# Patient Record
Sex: FEMALE | Race: AMERICAN INDIAN OR ALASKA NATIVE | NOT HISPANIC OR LATINO
[De-identification: names, ages, dates, MRNs, and addresses within clinical notes are randomized per-mention and may not be internally consistent; named-entity substitution may affect disease eponyms.]

---

## 2017-10-20 ENCOUNTER — RESULT REVIEW (OUTPATIENT)
Age: 67
End: 2017-10-20

## 2017-12-18 ENCOUNTER — OUTPATIENT (OUTPATIENT)
Dept: OUTPATIENT SERVICES | Facility: HOSPITAL | Age: 67
LOS: 1 days | End: 2017-12-18
Payer: MEDICARE

## 2017-12-18 ENCOUNTER — APPOINTMENT (OUTPATIENT)
Dept: RADIOLOGY | Facility: IMAGING CENTER | Age: 67
End: 2017-12-18
Payer: MEDICARE

## 2017-12-18 ENCOUNTER — APPOINTMENT (OUTPATIENT)
Dept: MAMMOGRAPHY | Facility: IMAGING CENTER | Age: 67
End: 2017-12-18
Payer: MEDICARE

## 2017-12-18 ENCOUNTER — APPOINTMENT (OUTPATIENT)
Dept: OBGYN | Facility: CLINIC | Age: 67
End: 2017-12-18

## 2017-12-18 DIAGNOSIS — Z00.8 ENCOUNTER FOR OTHER GENERAL EXAMINATION: ICD-10-CM

## 2017-12-18 PROCEDURE — 77063 BREAST TOMOSYNTHESIS BI: CPT

## 2017-12-18 PROCEDURE — G0202: CPT | Mod: 26

## 2017-12-18 PROCEDURE — 77063 BREAST TOMOSYNTHESIS BI: CPT | Mod: 26

## 2017-12-18 PROCEDURE — 77067 SCR MAMMO BI INCL CAD: CPT

## 2018-01-22 ENCOUNTER — APPOINTMENT (OUTPATIENT)
Dept: RADIOLOGY | Facility: IMAGING CENTER | Age: 68
End: 2018-01-22
Payer: MEDICARE

## 2018-01-22 ENCOUNTER — OUTPATIENT (OUTPATIENT)
Dept: OUTPATIENT SERVICES | Facility: HOSPITAL | Age: 68
LOS: 1 days | End: 2018-01-22
Payer: MEDICARE

## 2018-01-22 DIAGNOSIS — Z00.8 ENCOUNTER FOR OTHER GENERAL EXAMINATION: ICD-10-CM

## 2018-01-22 PROCEDURE — 77080 DXA BONE DENSITY AXIAL: CPT

## 2018-01-22 PROCEDURE — 77080 DXA BONE DENSITY AXIAL: CPT | Mod: 26

## 2018-12-22 ENCOUNTER — APPOINTMENT (OUTPATIENT)
Dept: MAMMOGRAPHY | Facility: IMAGING CENTER | Age: 68
End: 2018-12-22
Payer: MEDICARE

## 2018-12-22 ENCOUNTER — OUTPATIENT (OUTPATIENT)
Dept: OUTPATIENT SERVICES | Facility: HOSPITAL | Age: 68
LOS: 1 days | End: 2018-12-22
Payer: MEDICARE

## 2018-12-22 ENCOUNTER — INPATIENT (INPATIENT)
Facility: HOSPITAL | Age: 68
LOS: 1 days | Discharge: HOME | DRG: 282 | End: 2018-12-24
Attending: STUDENT IN AN ORGANIZED HEALTH CARE EDUCATION/TRAINING PROGRAM | Admitting: HOSPITALIST
Payer: MEDICARE

## 2018-12-22 VITALS
DIASTOLIC BLOOD PRESSURE: 90 MMHG | SYSTOLIC BLOOD PRESSURE: 142 MMHG | OXYGEN SATURATION: 99 % | HEIGHT: 59 IN | WEIGHT: 97 LBS | HEART RATE: 112 BPM | TEMPERATURE: 98 F | RESPIRATION RATE: 20 BRPM

## 2018-12-22 DIAGNOSIS — Z00.8 ENCOUNTER FOR OTHER GENERAL EXAMINATION: ICD-10-CM

## 2018-12-22 LAB
ALBUMIN SERPL ELPH-MCNC: 4.5 G/DL — SIGNIFICANT CHANGE UP (ref 3.3–5)
ALP SERPL-CCNC: 101 U/L — SIGNIFICANT CHANGE UP (ref 40–120)
ALT FLD-CCNC: 20 U/L — SIGNIFICANT CHANGE UP (ref 10–45)
ANION GAP SERPL CALC-SCNC: 15 MMOL/L — SIGNIFICANT CHANGE UP (ref 5–17)
APTT BLD: 31.3 SEC — SIGNIFICANT CHANGE UP (ref 27.5–36.3)
AST SERPL-CCNC: 39 U/L — SIGNIFICANT CHANGE UP (ref 10–40)
BILIRUB SERPL-MCNC: 0.4 MG/DL — SIGNIFICANT CHANGE UP (ref 0.2–1.2)
BUN SERPL-MCNC: 18 MG/DL — SIGNIFICANT CHANGE UP (ref 7–23)
CALCIUM SERPL-MCNC: 9.9 MG/DL — SIGNIFICANT CHANGE UP (ref 8.4–10.5)
CHLORIDE SERPL-SCNC: 94 MMOL/L — LOW (ref 96–108)
CO2 SERPL-SCNC: 23 MMOL/L — SIGNIFICANT CHANGE UP (ref 22–31)
CREAT SERPL-MCNC: 0.68 MG/DL — SIGNIFICANT CHANGE UP (ref 0.5–1.3)
D DIMER BLD IA.RAPID-MCNC: 212 NG/ML DDU — SIGNIFICANT CHANGE UP
GLUCOSE SERPL-MCNC: 128 MG/DL — HIGH (ref 70–99)
HBA1C BLD-MCNC: 5.9 % — HIGH (ref 4–5.6)
HCT VFR BLD CALC: 38.1 % — SIGNIFICANT CHANGE UP (ref 34.5–45)
HGB BLD-MCNC: 13.1 G/DL — SIGNIFICANT CHANGE UP (ref 11.5–15.5)
INR BLD: 1.02 RATIO — SIGNIFICANT CHANGE UP (ref 0.88–1.16)
LIDOCAIN IGE QN: 61 U/L — HIGH (ref 7–60)
MCHC RBC-ENTMCNC: 30.6 PG — SIGNIFICANT CHANGE UP (ref 27–34)
MCHC RBC-ENTMCNC: 34.3 GM/DL — SIGNIFICANT CHANGE UP (ref 32–36)
MCV RBC AUTO: 89.2 FL — SIGNIFICANT CHANGE UP (ref 80–100)
PLATELET # BLD AUTO: 389 K/UL — SIGNIFICANT CHANGE UP (ref 150–400)
POTASSIUM SERPL-MCNC: 5 MMOL/L — SIGNIFICANT CHANGE UP (ref 3.5–5.3)
POTASSIUM SERPL-SCNC: 5 MMOL/L — SIGNIFICANT CHANGE UP (ref 3.5–5.3)
PROT SERPL-MCNC: 9.2 G/DL — HIGH (ref 6–8.3)
PROTHROM AB SERPL-ACNC: 11.7 SEC — SIGNIFICANT CHANGE UP (ref 10–12.9)
RBC # BLD: 4.27 M/UL — SIGNIFICANT CHANGE UP (ref 3.8–5.2)
RBC # FLD: 12.2 % — SIGNIFICANT CHANGE UP (ref 10.3–14.5)
SODIUM SERPL-SCNC: 132 MMOL/L — LOW (ref 135–145)
TROPONIN T, HIGH SENSITIVITY RESULT: 23 NG/L — SIGNIFICANT CHANGE UP (ref 0–51)
TROPONIN T, HIGH SENSITIVITY RESULT: 23 NG/L — SIGNIFICANT CHANGE UP (ref 0–51)
TROPONIN T, HIGH SENSITIVITY RESULT: 28 NG/L — SIGNIFICANT CHANGE UP (ref 0–51)
WBC # BLD: 9.6 K/UL — SIGNIFICANT CHANGE UP (ref 3.8–10.5)
WBC # FLD AUTO: 9.6 K/UL — SIGNIFICANT CHANGE UP (ref 3.8–10.5)

## 2018-12-22 PROCEDURE — 93010 ELECTROCARDIOGRAM REPORT: CPT

## 2018-12-22 PROCEDURE — 71046 X-RAY EXAM CHEST 2 VIEWS: CPT | Mod: 26

## 2018-12-22 PROCEDURE — 77067 SCR MAMMO BI INCL CAD: CPT

## 2018-12-22 PROCEDURE — 77067 SCR MAMMO BI INCL CAD: CPT | Mod: 26

## 2018-12-22 PROCEDURE — 77063 BREAST TOMOSYNTHESIS BI: CPT

## 2018-12-22 PROCEDURE — 77063 BREAST TOMOSYNTHESIS BI: CPT | Mod: 26

## 2018-12-22 PROCEDURE — 99218: CPT

## 2018-12-22 RX ORDER — SODIUM CHLORIDE 9 MG/ML
1000 INJECTION INTRAMUSCULAR; INTRAVENOUS; SUBCUTANEOUS ONCE
Qty: 0 | Refills: 0 | Status: COMPLETED | OUTPATIENT
Start: 2018-12-22 | End: 2018-12-22

## 2018-12-22 RX ORDER — METOPROLOL TARTRATE 50 MG
50 TABLET ORAL ONCE
Qty: 0 | Refills: 0 | Status: COMPLETED | OUTPATIENT
Start: 2018-12-22 | End: 2018-12-22

## 2018-12-22 RX ORDER — METOPROLOL TARTRATE 50 MG
25 TABLET ORAL ONCE
Qty: 0 | Refills: 0 | Status: DISCONTINUED | OUTPATIENT
Start: 2018-12-22 | End: 2018-12-22

## 2018-12-22 RX ORDER — ASPIRIN/CALCIUM CARB/MAGNESIUM 324 MG
324 TABLET ORAL ONCE
Qty: 0 | Refills: 0 | Status: COMPLETED | OUTPATIENT
Start: 2018-12-22 | End: 2018-12-22

## 2018-12-22 RX ORDER — SODIUM CHLORIDE 9 MG/ML
3 INJECTION INTRAMUSCULAR; INTRAVENOUS; SUBCUTANEOUS EVERY 8 HOURS
Qty: 0 | Refills: 0 | Status: DISCONTINUED | OUTPATIENT
Start: 2018-12-22 | End: 2018-12-24

## 2018-12-22 RX ADMIN — SODIUM CHLORIDE 3 MILLILITER(S): 9 INJECTION INTRAMUSCULAR; INTRAVENOUS; SUBCUTANEOUS at 21:06

## 2018-12-22 RX ADMIN — SODIUM CHLORIDE 1000 MILLILITER(S): 9 INJECTION INTRAMUSCULAR; INTRAVENOUS; SUBCUTANEOUS at 17:11

## 2018-12-22 RX ADMIN — SODIUM CHLORIDE 1000 MILLILITER(S): 9 INJECTION INTRAMUSCULAR; INTRAVENOUS; SUBCUTANEOUS at 21:06

## 2018-12-22 RX ADMIN — SODIUM CHLORIDE 1000 MILLILITER(S): 9 INJECTION INTRAMUSCULAR; INTRAVENOUS; SUBCUTANEOUS at 22:06

## 2018-12-22 RX ADMIN — Medication 324 MILLIGRAM(S): at 10:06

## 2018-12-22 RX ADMIN — Medication 50 MILLIGRAM(S): at 23:40

## 2018-12-22 RX ADMIN — SODIUM CHLORIDE 2000 MILLILITER(S): 9 INJECTION INTRAMUSCULAR; INTRAVENOUS; SUBCUTANEOUS at 10:08

## 2018-12-22 RX ADMIN — SODIUM CHLORIDE 1000 MILLILITER(S): 9 INJECTION INTRAMUSCULAR; INTRAVENOUS; SUBCUTANEOUS at 12:10

## 2018-12-22 NOTE — ED PROVIDER NOTE - OBJECTIVE STATEMENT
68 year old female w/ pmhx HTN presents to ED c/o chest pain since yesterday evening. Patient describes pain as intermittent substernal, non-radiating, sharp pain. Denies provoking or palliative features. Pain is not pleuritic in nature and she denies similar symptoms in the past. She reports intermittent headaches for a week, attempted to see PCP but she is away for the month and was advised to go to urgent care. She reports in urgent care /80 and MD switched BP meds from metoprolol and amlodipine to losartan alone with concern that BP was cause for HA. Patient still w/ HA mildly relieved with Tylenol. Denies fevers, chills, sob, holt, abdominal pain, n/v/d, urinary symptoms, recent travel, calf pain/swelling

## 2018-12-22 NOTE — ED ADULT NURSE NOTE - NSIMPLEMENTINTERV_GEN_ALL_ED
Implemented All Universal Safety Interventions:  [place] to call system. Call bell, personal items and telephone within reach. Instruct patient to call for assistance. Room bathroom lighting operational. Non-slip footwear when patient is off stretcher. Physically safe environment: no spills, clutter or unnecessary equipment. Stretcher in lowest position, wheels locked, appropriate side rails in place.

## 2018-12-22 NOTE — ED CDU PROVIDER INITIAL DAY NOTE - MEDICAL DECISION MAKING DETAILS
DDx: ACS. Patient presenting with chest pain, elevated delta troponin, HEART score of 6. Will order stress test, continue observation for hemodynamic monitoring.  ATTG: Dr. Haile

## 2018-12-22 NOTE — ED CDU PROVIDER INITIAL DAY NOTE - OBJECTIVE STATEMENT
68 year old female w/ pmhx HTN presents to ED c/o chest pain since yesterday evening. Patient describes pain as intermittent substernal, non-radiating, sharp pain. Denies provoking or palliative features. Pain is not pleuritic in nature and she denies similar symptoms in the past. She reports intermittent headaches for a week, attempted to see PCP but she is away for the month and was advised to go to urgent care. She reports in urgent care /80 and MD switched BP meds from metoprolol and amlodipine to losartan alone with concern that BP was cause for HA. Patient still w/ HA mildly relieved with Tylenol. Denies fevers, chills, sob, holt, abdominal pain, n/v/d, urinary symptoms, recent travel, calf pain/swelling    In the ED VSS.  EKG nonischemic.  Trop 23 and 28.  Patient reports that her chest pain has completely resolved.  Nonsmoker.  no family Hx of CAD.  Will place in CDU for tele monitoring and stress test.

## 2018-12-22 NOTE — ED CDU PROVIDER DISPOSITION NOTE - PLAN OF CARE
1. Follow up with your PMD and cardiologist within 48-72 hours.   2. Show copies of your reports given to you. Recommend Aspirin 81mg over the counter daily until further evaluation.  Take all of your other medications as previously prescribed.   3. Worsening or continued chest pain, shortness of breath, weakness, return to ED.

## 2018-12-22 NOTE — ED CDU PROVIDER INITIAL DAY NOTE - DETAILS
vital signs q4h, monitor on tele, trend cardiac enzymes and EKG, Stress test, frequent re-evaluations

## 2018-12-22 NOTE — ED CDU PROVIDER INITIAL DAY NOTE - PROGRESS NOTE DETAILS
SIDDHARTH Couch: Patient seen at bedside in NAD.  VSS.  Patient resting comfortably without complaints. Patient remains tachy on tele to 110s. Will await D-Dimer results SIDDHARTH Couch: Patient slightly tachy 105 will give one more liter IVF. D-dimer negative SIDDHARTH Couch: Patient continues to have heart 110s on tele despite 3L IVF NS and when patient ambulates HR increases to 120s-130s. ED attending Dr. Mills recommended official cardiology consult from nocturnist. Discussed case with cardiology nocturnist Dr. Carlos who believed patient tachycardia likely rebound from metoprolol being discontinued. Recommended Lopressor 100mg x1 now (patient was on lopressor 100mg once daily). Made Dr. Carlos aware pt going for nuc stress tomorrow morning who stated if beta blocker given now results should not be skewed. Made ED attending Dr. Mills /71 and Dr. Mills discussed case with cardiology who recommended Lopressor 50mg. Awaiting official cardiology consult

## 2018-12-22 NOTE — ED CDU PROVIDER DISPOSITION NOTE - CLINICAL COURSE
68 year old female w/ pmhx HTN presents to ED c/o chest pain since yesterday evening. Patient describes pain as intermittent substernal, non-radiating, sharp pain. Denies provoking or palliative features. Pain is not pleuritic in nature and she denies similar symptoms in the past. She reports intermittent headaches for a week, attempted to see PCP but she is away for the month and was advised to go to urgent care. She reports in urgent care /80 and MD switched BP meds from metoprolol and amlodipine to losartan alone with concern that BP was cause for HA. Patient still w/ HA mildly relieved with Tylenol. Denies fevers, chills, sob, holt, abdominal pain, n/v/d, urinary symptoms, recent travel, calf pain/swelling    In the ED VSS.  EKG nonischemic.  Trop 23 and 28.  Patient reports that her chest pain has completely resolved.  Nonsmoker.  no family Hx of CAD.  Will place in CDU for tele monitoring and stress test. Patient noted to be persistently tachycardic to 110s on tele therefore obtained D-Dimer which showed______. Patient got nuc stress in AM which showed ________ 68 year old female w/ pmhx HTN presents to ED c/o chest pain since yesterday evening. Patient describes pain as intermittent substernal, non-radiating, sharp pain. Denies provoking or palliative features. Pain is not pleuritic in nature and she denies similar symptoms in the past. She reports intermittent headaches for a week, attempted to see PCP but she is away for the month and was advised to go to urgent care. She reports in urgent care /80 and MD switched BP meds from metoprolol and amlodipine to losartan alone with concern that BP was cause for HA. Patient still w/ HA mildly relieved with Tylenol. Denies fevers, chills, sob, holt, abdominal pain, n/v/d, urinary symptoms, recent travel, calf pain/swelling    In the ED VSS.  EKG nonischemic.  Trop 23 and 28.  Patient reports that her chest pain has completely resolved.  Nonsmoker.  no family Hx of CAD.  Will place in CDU for tele monitoring and stress test. Patient noted to be persistently tachycardic to 110s on tele therefore obtained D-Dimer which was negative. Patient got nuc stress in AM. Resting EKG showing new TWI in the lateral leads.  Cardiology recommending admission for cath.

## 2018-12-22 NOTE — ED CDU PROVIDER DISPOSITION NOTE - ATTENDING CONTRIBUTION TO CARE
ED attending Dr Jose Santana note:    Lab and radiology tests reviewed with patient and/or family.     I have personally performed a face to face diagnostic evaluation on this patient.  I have reviewed the ACP note and agree with the history, exam, and plan of care, except as noted.  History and Exam by me showed that patient doing well borderline tachycardia, treated with pos stress recommended by cards for admission for cath.

## 2018-12-22 NOTE — ED ADULT NURSE NOTE - OBJECTIVE STATEMENT
67 y/o female c/o intermittent, sharp, non radiating substernal chest pain since yesterday evening.  Patient reports intermittent headache for a week,  pt attempted to see PCP but PCP is away for the month and was advised to go to urgent care.  Pt reports in urgent care /80 and MD switched BP meds from metoprolol and amlodipine to, with concern that BP was cause for HA.   Denies fevers, chills, SOB,  abdominal pain, n/v/d, urinary symptoms, recent travel, calf pain/swelling.  No acute respiratory distress noted.

## 2018-12-23 DIAGNOSIS — I21.4 NON-ST ELEVATION (NSTEMI) MYOCARDIAL INFARCTION: ICD-10-CM

## 2018-12-23 DIAGNOSIS — I10 ESSENTIAL (PRIMARY) HYPERTENSION: ICD-10-CM

## 2018-12-23 DIAGNOSIS — M06.9 RHEUMATOID ARTHRITIS, UNSPECIFIED: ICD-10-CM

## 2018-12-23 DIAGNOSIS — R07.9 CHEST PAIN, UNSPECIFIED: ICD-10-CM

## 2018-12-23 LAB
ANION GAP SERPL CALC-SCNC: 13 MMOL/L — SIGNIFICANT CHANGE UP (ref 5–17)
APTT BLD: 109.1 SEC — HIGH (ref 27.5–36.3)
APTT BLD: 29.9 SEC — SIGNIFICANT CHANGE UP (ref 27.5–36.3)
BUN SERPL-MCNC: 8 MG/DL — SIGNIFICANT CHANGE UP (ref 7–23)
CALCIUM SERPL-MCNC: 9.2 MG/DL — SIGNIFICANT CHANGE UP (ref 8.4–10.5)
CHLORIDE SERPL-SCNC: 105 MMOL/L — SIGNIFICANT CHANGE UP (ref 96–108)
CHOLEST SERPL-MCNC: 131 MG/DL — SIGNIFICANT CHANGE UP (ref 10–199)
CO2 SERPL-SCNC: 22 MMOL/L — SIGNIFICANT CHANGE UP (ref 22–31)
CREAT SERPL-MCNC: 0.6 MG/DL — SIGNIFICANT CHANGE UP (ref 0.5–1.3)
GLUCOSE SERPL-MCNC: 101 MG/DL — HIGH (ref 70–99)
HCT VFR BLD CALC: 32.5 % — LOW (ref 34.5–45)
HDLC SERPL-MCNC: 40 MG/DL — LOW
HGB BLD-MCNC: 11.1 G/DL — LOW (ref 11.5–15.5)
INR BLD: 1.02 RATIO — SIGNIFICANT CHANGE UP (ref 0.88–1.16)
LIPID PNL WITH DIRECT LDL SERPL: 81 MG/DL — SIGNIFICANT CHANGE UP
MCHC RBC-ENTMCNC: 30.7 PG — SIGNIFICANT CHANGE UP (ref 27–34)
MCHC RBC-ENTMCNC: 34.2 GM/DL — SIGNIFICANT CHANGE UP (ref 32–36)
MCV RBC AUTO: 89.6 FL — SIGNIFICANT CHANGE UP (ref 80–100)
PLATELET # BLD AUTO: 331 K/UL — SIGNIFICANT CHANGE UP (ref 150–400)
POTASSIUM SERPL-MCNC: 4.3 MMOL/L — SIGNIFICANT CHANGE UP (ref 3.5–5.3)
POTASSIUM SERPL-SCNC: 4.3 MMOL/L — SIGNIFICANT CHANGE UP (ref 3.5–5.3)
PROT SERPL-MCNC: 7.8 G/DL — SIGNIFICANT CHANGE UP (ref 6–8.3)
PROTHROM AB SERPL-ACNC: 11.6 SEC — SIGNIFICANT CHANGE UP (ref 10–12.9)
RBC # BLD: 3.62 M/UL — LOW (ref 3.8–5.2)
RBC # FLD: 12.1 % — SIGNIFICANT CHANGE UP (ref 10.3–14.5)
SODIUM SERPL-SCNC: 140 MMOL/L — SIGNIFICANT CHANGE UP (ref 135–145)
TOTAL CHOLESTEROL/HDL RATIO MEASUREMENT: 3.3 RATIO — SIGNIFICANT CHANGE UP (ref 3.3–7.1)
TRIGL SERPL-MCNC: 48 MG/DL — SIGNIFICANT CHANGE UP (ref 10–149)
TROPONIN T, HIGH SENSITIVITY RESULT: 14 NG/L — SIGNIFICANT CHANGE UP (ref 0–51)
WBC # BLD: 7.7 K/UL — SIGNIFICANT CHANGE UP (ref 3.8–10.5)
WBC # FLD AUTO: 7.7 K/UL — SIGNIFICANT CHANGE UP (ref 3.8–10.5)

## 2018-12-23 PROCEDURE — 99217: CPT

## 2018-12-23 PROCEDURE — 99223 1ST HOSP IP/OBS HIGH 75: CPT

## 2018-12-23 RX ORDER — HEPARIN SODIUM 5000 [USP'U]/ML
2700 INJECTION INTRAVENOUS; SUBCUTANEOUS EVERY 6 HOURS
Qty: 0 | Refills: 0 | Status: DISCONTINUED | OUTPATIENT
Start: 2018-12-23 | End: 2018-12-24

## 2018-12-23 RX ORDER — ATORVASTATIN CALCIUM 80 MG/1
40 TABLET, FILM COATED ORAL AT BEDTIME
Qty: 0 | Refills: 0 | Status: DISCONTINUED | OUTPATIENT
Start: 2018-12-23 | End: 2018-12-24

## 2018-12-23 RX ORDER — HEPARIN SODIUM 5000 [USP'U]/ML
INJECTION INTRAVENOUS; SUBCUTANEOUS
Qty: 25000 | Refills: 0 | Status: DISCONTINUED | OUTPATIENT
Start: 2018-12-23 | End: 2018-12-24

## 2018-12-23 RX ORDER — METOPROLOL TARTRATE 50 MG
25 TABLET ORAL
Qty: 0 | Refills: 0 | Status: DISCONTINUED | OUTPATIENT
Start: 2018-12-23 | End: 2018-12-24

## 2018-12-23 RX ORDER — ETANERCEPT 25 MG
0 VIAL (EA) SUBCUTANEOUS
Qty: 0 | Refills: 0 | COMMUNITY

## 2018-12-23 RX ORDER — TICAGRELOR 90 MG/1
90 TABLET ORAL
Qty: 0 | Refills: 0 | Status: DISCONTINUED | OUTPATIENT
Start: 2018-12-24 | End: 2018-12-24

## 2018-12-23 RX ORDER — HEPARIN SODIUM 5000 [USP'U]/ML
2700 INJECTION INTRAVENOUS; SUBCUTANEOUS ONCE
Qty: 0 | Refills: 0 | Status: COMPLETED | OUTPATIENT
Start: 2018-12-23 | End: 2018-12-23

## 2018-12-23 RX ORDER — TICAGRELOR 90 MG/1
180 TABLET ORAL ONCE
Qty: 0 | Refills: 0 | Status: COMPLETED | OUTPATIENT
Start: 2018-12-23 | End: 2018-12-23

## 2018-12-23 RX ORDER — ASPIRIN/CALCIUM CARB/MAGNESIUM 324 MG
81 TABLET ORAL DAILY
Qty: 0 | Refills: 0 | Status: DISCONTINUED | OUTPATIENT
Start: 2018-12-23 | End: 2018-12-24

## 2018-12-23 RX ORDER — METOPROLOL TARTRATE 50 MG
1 TABLET ORAL
Qty: 0 | Refills: 0 | COMMUNITY

## 2018-12-23 RX ADMIN — Medication 25 MILLIGRAM(S): at 17:32

## 2018-12-23 RX ADMIN — HEPARIN SODIUM 500 UNIT(S)/HR: 5000 INJECTION INTRAVENOUS; SUBCUTANEOUS at 16:17

## 2018-12-23 RX ADMIN — SODIUM CHLORIDE 3 MILLILITER(S): 9 INJECTION INTRAMUSCULAR; INTRAVENOUS; SUBCUTANEOUS at 16:39

## 2018-12-23 RX ADMIN — Medication 81 MILLIGRAM(S): at 17:32

## 2018-12-23 RX ADMIN — SODIUM CHLORIDE 3 MILLILITER(S): 9 INJECTION INTRAMUSCULAR; INTRAVENOUS; SUBCUTANEOUS at 22:42

## 2018-12-23 RX ADMIN — HEPARIN SODIUM 2700 UNIT(S): 5000 INJECTION INTRAVENOUS; SUBCUTANEOUS at 16:16

## 2018-12-23 RX ADMIN — SODIUM CHLORIDE 3 MILLILITER(S): 9 INJECTION INTRAMUSCULAR; INTRAVENOUS; SUBCUTANEOUS at 05:04

## 2018-12-23 RX ADMIN — TICAGRELOR 180 MILLIGRAM(S): 90 TABLET ORAL at 15:50

## 2018-12-23 RX ADMIN — HEPARIN SODIUM 0 UNIT(S)/HR: 5000 INJECTION INTRAVENOUS; SUBCUTANEOUS at 22:35

## 2018-12-23 RX ADMIN — HEPARIN SODIUM 350 UNIT(S)/HR: 5000 INJECTION INTRAVENOUS; SUBCUTANEOUS at 23:38

## 2018-12-23 RX ADMIN — ATORVASTATIN CALCIUM 40 MILLIGRAM(S): 80 TABLET, FILM COATED ORAL at 22:34

## 2018-12-23 NOTE — H&P ADULT - PMH
Essential hypertension    Rheumatoid arthritis involving multiple sites, unspecified rheumatoid factor presence

## 2018-12-23 NOTE — ED CDU PROVIDER INITIAL DAY NOTE - RESPIRATORY NEGATIVE STATEMENT, MLM
Dr. Pollard patient, agrees, all questions answered; rehab team no chest pain, no cough, and no shortness of breath.

## 2018-12-23 NOTE — CONSULT NOTE ADULT - SUBJECTIVE AND OBJECTIVE BOX
Initial Cardiology Attending Consult    CHIEF COMPLAINT: chest pain    HISTORY OF PRESENT ILLNESS:  BALTAZAR BERKOWITZ is a 68y Female patient PMH HTN, HLD  presenting with intermittent substernal, non-radiating, sharp pain. It occurs with activity and at rest, No Assoc SOB  On 12/18 she went to her PCP due to 2 weeks of headaches she found her PCP was away and was told to go to urgent care.  At urgent care her BP was elevated at 150/80 and she was instructed to stop her metoprolol 100mg and amlodipine 5mg daily and begin losartan.   Today she presents to the ED with continued HA but now also has CP, and was found to be tachycardic.  She denies SOB, lightheadness or dizziness. No blurred vision.   Triage vitals 142/90,     Allergies    No Known Allergies    Intolerances    	    PAST MEDICAL & SURGICAL HISTORY:      FAMILY HISTORY:      SOCIAL HISTORY:    [x ] Non-smoker  [ ] Smoker  [ ] Alcohol      REVIEW OF SYSTEMS:  CONSTITUTIONAL: No fever, weight loss, or fatigue  EYES:no blurred vision.  No eye pain, visual disturbances, or discharge  ENMT:  No difficulty hearing, tinnitus, vertigo; No sinus or throat pain  NECK: No pain or stiffness  RESPIRATORY: No cough, wheezing, chills or hemoptysis; No Shortness of Breath  CARDIOVASCULAR: +chest pain, no palpitations, passing out, dizziness, or leg swelling  GASTROINTESTINAL: No abdominal or epigastric pain. No nausea, vomiting, or hematemesis; No diarrhea or constipation. No melena or hematochezia.  GENITOURINARY: No dysuria, frequency, hematuria, or incontinence  NEUROLOGICAL: + headaches, no memory loss, loss of strength, numbness, or tremors  SKIN: No itching, burning, rashes, or lesions   LYMPH Nodes: No enlarged glands  ENDOCRINE: No heat or cold intolerance; No hair loss  MUSCULOSKELETAL: No joint pain or swelling; No muscle, back, or extremity pain  PSYCHIATRIC: No depression, anxiety, mood swings, or difficulty sleeping  HEME/LYMPH: No easy bruising, or bleeding gums  ALLERY AND IMMUNOLOGIC: No hives or eczema	    [ ] All others negative	  [ ] Unable to obtain    PHYSICAL EXAM:  I&O's Summary  Vital Signs Last 24 Hrs  T(C): 37.3 (22 Dec 2018 23:26), Max: 37.3 (22 Dec 2018 23:26)  T(F): 99.2 (22 Dec 2018 23:26), Max: 99.2 (22 Dec 2018 23:26)  HR: 100 (22 Dec 2018 23:35) (100 - 112) HR 130s with ambulation  BP: 107/71 (22 Dec 2018 23:35) (94/50 - 147/71)  BP(mean): --  RR: 16 (22 Dec 2018 23:26) (16 - 20)  SpO2: 100% (22 Dec 2018 23:26) (97% - 100%)    Appearance: Normal	  HEENT:   Normal oral mucosa, PERRL, EOMI	  Lymphatic: No lymphadenopathy  Cardiovascular: regular, tachycardic,  No JVD, No murmurs, No edema  Respiratory: Lungs clear to auscultation	  Psychiatry: A & O x 3, Mood & affect appropriate  Gastrointestinal:  Soft, Non-tender, + BS	  Skin: No rashes, No ecchymoses, No cyanosis	  Neurologic: Non-focal  Extremities: Normal range of motion, No clubbing, cyanosis or edema  Vascular: Peripheral pulses palpable 2+ bilaterally    MEDICATIONS:  Home Medications:losartan          LABS:	 	    CBC Full  -  ( 22 Dec 2018 10:17 )  WBC Count : 9.6 K/uL  Hemoglobin : 13.1 g/dL  Hematocrit : 38.1 %  Platelet Count - Automated : 389 K/uL  Mean Cell Volume : 89.2 fl  Mean Cell Hemoglobin : 30.6 pg  Mean Cell Hemoglobin Concentration : 34.3 gm/dL  Auto Neutrophil # : x  Auto Lymphocyte # : x  Auto Monocyte # : x  Auto Eosinophil # : x  Auto Basophil # : x  Auto Neutrophil % : x  Auto Lymphocyte % : x  Auto Monocyte % : x  Auto Eosinophil % : x  Auto Basophil % : x    12-22    132<L>  |  94<L>  |  18  ----------------------------<  128<H>  5.0   |  23  |  0.68    Ca    9.9      22 Dec 2018 10:17    TPro  9.2<H>  /  Alb  4.5  /  TBili  0.4  /  DBili  x   /  AST  39  /  ALT  20  /  AlkPhos  101  12-22      proBNP:   Lipid Profile:   HgA1c:   TSH:     TROPONIN:        TELEMETRY: 	-130    ECG:  	 non spec st   RADIOLOGY:  OTHER: 	    CARDIAC TESTING/STUDIES:    [ ] Echocardiogram:  [ ]  Catheterization:  [ ] Stress Test:  	  	  ASSESSMENT/PLAN: 	  BALTAZAR BERKOWITZ is a 68y Female patient PMH HTN, HLD  presenting with intermittent substernal, non-radiating, sharp pain and tachycardia and headaches.  the elevated HR is likely due to rebound from her abrubpt discontinuation of beta blocker.  Will restart metopropolol at half dose and titrate as needed.  For chest pain, atypical angina vs non cardiac , rule out acs with plan for exercise stress.    tachycardia with hypotension  likely due to bb withdrawl reboud  begin 50mg metoprol BID for improved HR control goal <100 sitting    HTN, currently hyptensive  HR control with metoprolol if BP becomes >130/80 restart norvasc 5mg and increase to 10mg if needed    chest pain  Rule out ACS  -trend troponin  -monitor on telemetry  -serial EKGs  -ASA 325mg once then 81mg daily  -if the patient rules out, plan for stress test tomorrow      Lowell Carlos MD, PhD  Cardiology Attending  [facility]  [phone]    (Cardiology Nocturnist cell number available 7 pm - 7 am every night; available daytime week days for follow-up only; daytime weekends covered by general cardiology consult service)

## 2018-12-23 NOTE — H&P ADULT - HISTORY OF PRESENT ILLNESS
68 yr old F w/a PMH of HTN and Rheumatoid Arthritis presented with intermitted substernal chest pain described as non radiating and sharp in nature initially 2 days ago.  patient reports she went to see her PCP on the 18th who is on vacation and ended up at an urgent care where she was diagnosed with uncontrolled HTN and was taken off her beta blocker and Norvasc and put on losartan.  She additionally reports having a headache for 2 weeks duration.

## 2018-12-23 NOTE — ED CDU PROVIDER SUBSEQUENT DAY NOTE - PROGRESS NOTE DETAILS
SIDDHARTH Couch: Patient seen at bedside in NAD.  VSS.  Patient resting comfortably without complaints. Patient seen by cardiology nocturnist Dr. Carlos who stated tachycardia likely due to bb withdrawal rebound therefore to begin 50mg metoprol BID for improved HR control goal <100 sitting. Patient heart rate currently 88bpm seen on tele and /66 after receiving metop 50mg. Will hold further BB use until after nuc stress SIDDHARTH Couch: Patient seen at bedside in NAD.  VSS.  Patient resting comfortably without complaints. Heart 76bpm on tele. Pending stress in AM Patient seen at bedside in NAD.  VSS.  Patient resting comfortably without complaints. HR 86 on tele.  No acute events overnight.  Awaiting Stress. -Saeed Santos PA-C contacted by stress lab.  Patient with new TWI in the lateral leads.  Discussed case with Dr. Lopez who is recommending admission for cardiac cath.  -Saeed Santos PA-C

## 2018-12-23 NOTE — H&P ADULT - PROBLEM SELECTOR PLAN 1
Troponin intermediate but increased by 5 on repeat   Chest pain atypical  During resting EKG at stress test patient with dynamic EKG changes.  T wave inversions in lateral leads     Start hep gtt  Loaded with ASA continue with asa 81   Brilinta load   Atorvastatin 40

## 2018-12-23 NOTE — ED ADULT NURSE REASSESSMENT NOTE - COMFORT CARE
darkened lights/repositioned/warm blanket provided/ambulated to bathroom/plan of care explained/po fluids offered
plan of care explained/po fluids offered/ambulated to bathroom

## 2018-12-23 NOTE — ED ADULT NURSE REASSESSMENT NOTE - NSIMPLEMENTINTERV_GEN_ALL_ED
Implemented All Universal Safety Interventions:  [place] to call system. Call bell, personal items and telephone within reach. Instruct patient to call for assistance. Room bathroom lighting operational. Non-slip footwear when patient is off stretcher. Physically safe environment: no spills, clutter or unnecessary equipment. Stretcher in lowest position, wheels locked, appropriate side rails in place.

## 2018-12-23 NOTE — H&P ADULT - NSHPLABSRESULTS_GEN_ALL_CORE
LABS:                         13.1   9.6   )-----------( 389      ( 22 Dec 2018 10:17 )             38.1     12-22    132<L>  |  94<L>  |  18  ----------------------------<  128<H>  5.0   |  23  |  0.68    Ca    9.9      22 Dec 2018 10:17    TPro  9.2<H>  /  Alb  4.5  /  TBili  0.4  /  DBili  x   /  AST  39  /  ALT  20  /  AlkPhos  101  12-22    PT/INR - ( 22 Dec 2018 10:17 )   PT: 11.7 sec;   INR: 1.02 ratio         PTT - ( 22 Dec 2018 10:17 )  PTT:31.3 sec            Records reviewed from prior hospitalization.  Labs reviewed remarkable for   EKG personally reviewed   CXR personally reviewed

## 2018-12-24 ENCOUNTER — TRANSCRIPTION ENCOUNTER (OUTPATIENT)
Age: 68
End: 2018-12-24

## 2018-12-24 VITALS
RESPIRATION RATE: 18 BRPM | DIASTOLIC BLOOD PRESSURE: 74 MMHG | TEMPERATURE: 98 F | HEART RATE: 78 BPM | SYSTOLIC BLOOD PRESSURE: 118 MMHG | OXYGEN SATURATION: 100 %

## 2018-12-24 LAB
ANION GAP SERPL CALC-SCNC: 12 MMOL/L — SIGNIFICANT CHANGE UP (ref 5–17)
APTT BLD: 52 SEC — HIGH (ref 27.5–36.3)
BUN SERPL-MCNC: 12 MG/DL — SIGNIFICANT CHANGE UP (ref 7–23)
CALCIUM SERPL-MCNC: 9.1 MG/DL — SIGNIFICANT CHANGE UP (ref 8.4–10.5)
CHLORIDE SERPL-SCNC: 104 MMOL/L — SIGNIFICANT CHANGE UP (ref 96–108)
CO2 SERPL-SCNC: 22 MMOL/L — SIGNIFICANT CHANGE UP (ref 22–31)
CREAT SERPL-MCNC: 0.63 MG/DL — SIGNIFICANT CHANGE UP (ref 0.5–1.3)
GLUCOSE SERPL-MCNC: 100 MG/DL — HIGH (ref 70–99)
HCT VFR BLD CALC: 33.3 % — LOW (ref 34.5–45)
HGB BLD-MCNC: 11.5 G/DL — SIGNIFICANT CHANGE UP (ref 11.5–15.5)
MCHC RBC-ENTMCNC: 30.9 PG — SIGNIFICANT CHANGE UP (ref 27–34)
MCHC RBC-ENTMCNC: 34.5 GM/DL — SIGNIFICANT CHANGE UP (ref 32–36)
MCV RBC AUTO: 89.5 FL — SIGNIFICANT CHANGE UP (ref 80–100)
PLATELET # BLD AUTO: 320 K/UL — SIGNIFICANT CHANGE UP (ref 150–400)
POTASSIUM SERPL-MCNC: 4.5 MMOL/L — SIGNIFICANT CHANGE UP (ref 3.5–5.3)
POTASSIUM SERPL-SCNC: 4.5 MMOL/L — SIGNIFICANT CHANGE UP (ref 3.5–5.3)
RBC # BLD: 3.72 M/UL — LOW (ref 3.8–5.2)
RBC # FLD: 12 % — SIGNIFICANT CHANGE UP (ref 10.3–14.5)
SODIUM SERPL-SCNC: 138 MMOL/L — SIGNIFICANT CHANGE UP (ref 135–145)
WBC # BLD: 8.2 K/UL — SIGNIFICANT CHANGE UP (ref 3.8–10.5)
WBC # FLD AUTO: 8.2 K/UL — SIGNIFICANT CHANGE UP (ref 3.8–10.5)

## 2018-12-24 PROCEDURE — 85379 FIBRIN DEGRADATION QUANT: CPT

## 2018-12-24 PROCEDURE — 99239 HOSP IP/OBS DSCHRG MGMT >30: CPT

## 2018-12-24 PROCEDURE — 71046 X-RAY EXAM CHEST 2 VIEWS: CPT

## 2018-12-24 PROCEDURE — 84155 ASSAY OF PROTEIN SERUM: CPT

## 2018-12-24 PROCEDURE — 76937 US GUIDE VASCULAR ACCESS: CPT

## 2018-12-24 PROCEDURE — 80061 LIPID PANEL: CPT

## 2018-12-24 PROCEDURE — 93458 L HRT ARTERY/VENTRICLE ANGIO: CPT | Mod: 26,GC

## 2018-12-24 PROCEDURE — 99152 MOD SED SAME PHYS/QHP 5/>YRS: CPT

## 2018-12-24 PROCEDURE — 99152 MOD SED SAME PHYS/QHP 5/>YRS: CPT | Mod: GC

## 2018-12-24 PROCEDURE — 83690 ASSAY OF LIPASE: CPT

## 2018-12-24 PROCEDURE — 76937 US GUIDE VASCULAR ACCESS: CPT | Mod: 26,GC

## 2018-12-24 PROCEDURE — 78452 HT MUSCLE IMAGE SPECT MULT: CPT

## 2018-12-24 PROCEDURE — C1887: CPT

## 2018-12-24 PROCEDURE — 85730 THROMBOPLASTIN TIME PARTIAL: CPT

## 2018-12-24 PROCEDURE — 96360 HYDRATION IV INFUSION INIT: CPT

## 2018-12-24 PROCEDURE — 96361 HYDRATE IV INFUSION ADD-ON: CPT

## 2018-12-24 PROCEDURE — 80053 COMPREHEN METABOLIC PANEL: CPT

## 2018-12-24 PROCEDURE — 78452 HT MUSCLE IMAGE SPECT MULT: CPT | Mod: 26

## 2018-12-24 PROCEDURE — A9500: CPT

## 2018-12-24 PROCEDURE — 93005 ELECTROCARDIOGRAM TRACING: CPT

## 2018-12-24 PROCEDURE — 80048 BASIC METABOLIC PNL TOTAL CA: CPT

## 2018-12-24 PROCEDURE — C1894: CPT

## 2018-12-24 PROCEDURE — 85027 COMPLETE CBC AUTOMATED: CPT

## 2018-12-24 PROCEDURE — C1769: CPT

## 2018-12-24 PROCEDURE — G0378: CPT

## 2018-12-24 PROCEDURE — 99285 EMERGENCY DEPT VISIT HI MDM: CPT | Mod: 25

## 2018-12-24 PROCEDURE — 84484 ASSAY OF TROPONIN QUANT: CPT

## 2018-12-24 PROCEDURE — C1760: CPT

## 2018-12-24 PROCEDURE — 85610 PROTHROMBIN TIME: CPT

## 2018-12-24 PROCEDURE — 83036 HEMOGLOBIN GLYCOSYLATED A1C: CPT

## 2018-12-24 PROCEDURE — 93458 L HRT ARTERY/VENTRICLE ANGIO: CPT

## 2018-12-24 RX ORDER — ASPIRIN/CALCIUM CARB/MAGNESIUM 324 MG
1 TABLET ORAL
Qty: 30 | Refills: 0 | OUTPATIENT
Start: 2018-12-24 | End: 2019-01-22

## 2018-12-24 RX ORDER — AMLODIPINE BESYLATE 2.5 MG/1
1 TABLET ORAL
Qty: 0 | Refills: 0 | COMMUNITY

## 2018-12-24 RX ORDER — ATORVASTATIN CALCIUM 80 MG/1
1 TABLET, FILM COATED ORAL
Qty: 30 | Refills: 0 | OUTPATIENT
Start: 2018-12-24 | End: 2019-01-22

## 2018-12-24 RX ADMIN — TICAGRELOR 90 MILLIGRAM(S): 90 TABLET ORAL at 05:54

## 2018-12-24 RX ADMIN — SODIUM CHLORIDE 3 MILLILITER(S): 9 INJECTION INTRAMUSCULAR; INTRAVENOUS; SUBCUTANEOUS at 13:13

## 2018-12-24 RX ADMIN — SODIUM CHLORIDE 3 MILLILITER(S): 9 INJECTION INTRAMUSCULAR; INTRAVENOUS; SUBCUTANEOUS at 06:23

## 2018-12-24 RX ADMIN — Medication 81 MILLIGRAM(S): at 11:18

## 2018-12-24 RX ADMIN — HEPARIN SODIUM 450 UNIT(S)/HR: 5000 INJECTION INTRAVENOUS; SUBCUTANEOUS at 06:22

## 2018-12-24 RX ADMIN — Medication 25 MILLIGRAM(S): at 05:53

## 2018-12-24 NOTE — DISCHARGE NOTE ADULT - PLAN OF CARE
stable. EKG with T wave inversion.   s/p cardiac cath, clean coronaries.  HOME CARE INSTRUCTIONS  For the next few days, avoid physical activities that bring on chest pain. Continue physical activities as directed.  Do not smoke.  Avoid drinking alcohol.   Only take over-the-counter or prescription medicine for pain, discomfort, or fever as directed by your caregiver.  Follow your caregiver's suggestions for further testing if your chest pain does not go away.  Keep any follow-up appointments you made. If you do not go to an appointment, you could develop lasting (chronic) problems with pain. If there is any problem keeping an appointment, you must call to reschedule.   SEEK MEDICAL CARE IF:  You think you are having problems from the medicine you are taking. Read your medicine instructions carefully.  Your chest pain does not go away, even after treatment.  You develop a rash with blisters on your chest.  SEEK IMMEDIATE MEDICAL CARE IF:  You have increased chest pain or pain that spreads to your arm, neck, jaw, back, or abdomen.   You develop shortness of breath, an increasing cough, or you are coughing up blood.  You have severe back or abdominal pain, feel nauseous, or vomit.  You develop severe weakness, fainting, or chills.  You have a fever. continue with Enbrel. Low salt diet  Activity as tolerated.  Take all medication as prescribed.  Follow up with your medical doctor for routine blood pressure monitoring at your next visit.  Notify your doctor if you have any of the following symptoms:   Dizziness, Lightheadedness, Blurry vision, Headache, Chest pain, Shortness of breath

## 2018-12-24 NOTE — DISCHARGE NOTE ADULT - SECONDARY DIAGNOSIS.
Rheumatoid arthritis involving multiple sites, unspecified rheumatoid factor presence Essential hypertension

## 2018-12-24 NOTE — DISCHARGE NOTE ADULT - CARE PLAN
Principal Discharge DX:	NSTEMI (non-ST elevated myocardial infarction)  Goal:	stable.  Assessment and plan of treatment:	EKG with T wave inversion.   s/p cardiac cath, clean coronaries.  HOME CARE INSTRUCTIONS  For the next few days, avoid physical activities that bring on chest pain. Continue physical activities as directed.  Do not smoke.  Avoid drinking alcohol.   Only take over-the-counter or prescription medicine for pain, discomfort, or fever as directed by your caregiver.  Follow your caregiver's suggestions for further testing if your chest pain does not go away.  Keep any follow-up appointments you made. If you do not go to an appointment, you could develop lasting (chronic) problems with pain. If there is any problem keeping an appointment, you must call to reschedule.   SEEK MEDICAL CARE IF:  You think you are having problems from the medicine you are taking. Read your medicine instructions carefully.  Your chest pain does not go away, even after treatment.  You develop a rash with blisters on your chest.  SEEK IMMEDIATE MEDICAL CARE IF:  You have increased chest pain or pain that spreads to your arm, neck, jaw, back, or abdomen.   You develop shortness of breath, an increasing cough, or you are coughing up blood.  You have severe back or abdominal pain, feel nauseous, or vomit.  You develop severe weakness, fainting, or chills.  You have a fever.  Secondary Diagnosis:	Rheumatoid arthritis involving multiple sites, unspecified rheumatoid factor presence  Assessment and plan of treatment:	continue with Enbrel.  Secondary Diagnosis:	Essential hypertension  Assessment and plan of treatment:	Low salt diet  Activity as tolerated.  Take all medication as prescribed.  Follow up with your medical doctor for routine blood pressure monitoring at your next visit.  Notify your doctor if you have any of the following symptoms:   Dizziness, Lightheadedness, Blurry vision, Headache, Chest pain, Shortness of breath

## 2018-12-24 NOTE — DISCHARGE NOTE ADULT - PATIENT PORTAL LINK FT
You can access the [facility] Patient Portal, offered by [facility], by registering with the following website: [email or web address]

## 2018-12-24 NOTE — CHART NOTE - NSCHARTNOTEFT_GEN_A_CORE
Briefly, 68F with HTN, RA presents with chest pain found to have new TWIs, s/p LHC with normal coronary arteries. Medical management was recommended. No further episodes of chest pain, tele monitor with NSR 60s without acute events. Pt is medically stable for discharge with close follow up with cardiologist. Care discussed with the patient and daughter-in-law at bedside, who are in agreement with the plan. All questions and concerns were addressed    48 minutes spent on discharge process    Vero Lott MD  Division of Hospital Medicine  Pager: [phone]  Office: [phone]

## 2018-12-24 NOTE — DISCHARGE NOTE ADULT - MEDICATION SUMMARY - MEDICATIONS TO TAKE
I will START or STAY ON the medications listed below when I get home from the hospital:    aspirin 81 mg oral tablet, chewable  -- 1 tab(s) by mouth once a day  -- Indication: For NSTEMI (non-ST elevated myocardial infarction)    Lipitor 20 mg oral tablet  -- 1 tab(s) by mouth once a day  -- Indication: For NSTEMI (non-ST elevated myocardial infarction)    Toprol- mg oral tablet, extended release  -- 1 tab(s) by mouth once a day  -- Indication: For NSTEMI (non-ST elevated myocardial infarction)    Enbrel 25 mg subcutaneous kit  -- Indication: For Rheumatoid arthritis involving multiple sites, unspecified rheumatoid factor presence

## 2019-07-08 PROBLEM — M06.9 RHEUMATOID ARTHRITIS, UNSPECIFIED: Chronic | Status: ACTIVE | Noted: 2018-12-23

## 2019-07-08 PROBLEM — I10 ESSENTIAL (PRIMARY) HYPERTENSION: Chronic | Status: ACTIVE | Noted: 2018-12-23

## 2019-12-23 ENCOUNTER — OUTPATIENT (OUTPATIENT)
Dept: OUTPATIENT SERVICES | Facility: HOSPITAL | Age: 69
LOS: 1 days | End: 2019-12-23
Payer: MEDICARE

## 2019-12-23 ENCOUNTER — APPOINTMENT (OUTPATIENT)
Dept: MAMMOGRAPHY | Facility: IMAGING CENTER | Age: 69
End: 2019-12-23
Payer: MEDICARE

## 2019-12-23 DIAGNOSIS — Z00.8 ENCOUNTER FOR OTHER GENERAL EXAMINATION: ICD-10-CM

## 2019-12-23 PROCEDURE — 77067 SCR MAMMO BI INCL CAD: CPT | Mod: 26

## 2019-12-23 PROCEDURE — 77063 BREAST TOMOSYNTHESIS BI: CPT | Mod: 26

## 2019-12-23 PROCEDURE — 77063 BREAST TOMOSYNTHESIS BI: CPT

## 2019-12-23 PROCEDURE — 77067 SCR MAMMO BI INCL CAD: CPT

## 2020-01-27 ENCOUNTER — APPOINTMENT (OUTPATIENT)
Dept: RADIOLOGY | Facility: IMAGING CENTER | Age: 70
End: 2020-01-27
Payer: MEDICARE

## 2020-01-27 ENCOUNTER — OUTPATIENT (OUTPATIENT)
Dept: OUTPATIENT SERVICES | Facility: HOSPITAL | Age: 70
LOS: 1 days | End: 2020-01-27
Payer: MEDICARE

## 2020-01-27 DIAGNOSIS — Z00.8 ENCOUNTER FOR OTHER GENERAL EXAMINATION: ICD-10-CM

## 2020-01-27 PROCEDURE — 77080 DXA BONE DENSITY AXIAL: CPT | Mod: 26

## 2020-01-27 PROCEDURE — 77080 DXA BONE DENSITY AXIAL: CPT

## 2020-12-28 ENCOUNTER — OUTPATIENT (OUTPATIENT)
Dept: OUTPATIENT SERVICES | Facility: HOSPITAL | Age: 70
LOS: 1 days | End: 2020-12-28
Payer: MEDICARE

## 2020-12-28 ENCOUNTER — APPOINTMENT (OUTPATIENT)
Dept: MAMMOGRAPHY | Facility: IMAGING CENTER | Age: 70
End: 2020-12-28
Payer: MEDICARE

## 2020-12-28 DIAGNOSIS — Z00.8 ENCOUNTER FOR OTHER GENERAL EXAMINATION: ICD-10-CM

## 2020-12-28 DIAGNOSIS — Z01.419 ENCOUNTER FOR GYNECOLOGICAL EXAMINATION (GENERAL) (ROUTINE) WITHOUT ABNORMAL FINDINGS: ICD-10-CM

## 2020-12-28 PROCEDURE — 77067 SCR MAMMO BI INCL CAD: CPT

## 2020-12-28 PROCEDURE — 77063 BREAST TOMOSYNTHESIS BI: CPT | Mod: 26

## 2020-12-28 PROCEDURE — 77063 BREAST TOMOSYNTHESIS BI: CPT

## 2020-12-28 PROCEDURE — 77067 SCR MAMMO BI INCL CAD: CPT | Mod: 26

## 2021-03-30 NOTE — PATIENT PROFILE ADULT - NSPROEDAREADYLEARN_GEN_A_NUR
Not RN protocol.    Requesting Vyvanase: Last refill:     Disp Refills Start End LYLE   lisdexamfetamine (VYVANSE) 50 MG capsule 30 capsule 0 3/2/2021  No   Sig - Route: Take 1 capsule (50 mg) by mouth every morning - Oral   Sent to pharmacy as: Lisdexamfetamine Dimesylate 50 MG Oral Capsule (Vyvanse)   Class: E-Prescribe   Earliest Fill Date: 3/2/2021     Please refill as appropriate.  Almita Trevino RN  [facility]       none

## 2021-06-22 ENCOUNTER — RESULT REVIEW (OUTPATIENT)
Age: 71
End: 2021-06-22

## 2021-09-03 ENCOUNTER — APPOINTMENT (OUTPATIENT)
Dept: DISASTER EMERGENCY | Facility: CLINIC | Age: 71
End: 2021-09-03

## 2021-09-03 LAB — SARS-COV-2 N GENE NPH QL NAA+PROBE: NOT DETECTED

## 2021-11-15 ENCOUNTER — APPOINTMENT (OUTPATIENT)
Dept: HEPATOLOGY | Facility: CLINIC | Age: 71
End: 2021-11-15
Payer: MEDICARE

## 2021-11-15 ENCOUNTER — NON-APPOINTMENT (OUTPATIENT)
Age: 71
End: 2021-11-15

## 2021-11-15 VITALS
BODY MASS INDEX: 14.66 KG/M2 | HEIGHT: 65 IN | DIASTOLIC BLOOD PRESSURE: 84 MMHG | OXYGEN SATURATION: 98 % | RESPIRATION RATE: 12 BRPM | SYSTOLIC BLOOD PRESSURE: 162 MMHG | WEIGHT: 88 LBS | HEART RATE: 79 BPM | TEMPERATURE: 98 F

## 2021-11-15 DIAGNOSIS — Z78.9 OTHER SPECIFIED HEALTH STATUS: ICD-10-CM

## 2021-11-15 LAB
A1AT SERPL-MCNC: 171 MG/DL
ALBUMIN SERPL ELPH-MCNC: 3.9 G/DL
ALP BLD-CCNC: 197 U/L
ALT SERPL-CCNC: 513 U/L
ANION GAP SERPL CALC-SCNC: 13 MMOL/L
AST SERPL-CCNC: 182 U/L
BASOPHILS # BLD AUTO: 0.01 K/UL
BASOPHILS NFR BLD AUTO: 0.1 %
BILIRUB SERPL-MCNC: 0.4 MG/DL
BUN SERPL-MCNC: 13 MG/DL
CALCIUM SERPL-MCNC: 9.6 MG/DL
CHLORIDE SERPL-SCNC: 99 MMOL/L
CO2 SERPL-SCNC: 25 MMOL/L
CREAT SERPL-MCNC: 0.65 MG/DL
EOSINOPHIL # BLD AUTO: 0.01 K/UL
EOSINOPHIL NFR BLD AUTO: 0.1 %
HBV CORE IGM SER QL: NONREACTIVE
HCT VFR BLD CALC: 41.8 %
HGB BLD-MCNC: 13.5 G/DL
IGA SER QL IEP: 587 MG/DL
IGG SER QL IEP: 2029 MG/DL
IGM SER QL IEP: 340 MG/DL
IMM GRANULOCYTES NFR BLD AUTO: 0.2 %
LYMPHOCYTES # BLD AUTO: 4.1 K/UL
LYMPHOCYTES NFR BLD AUTO: 36.9 %
MAN DIFF?: NORMAL
MCHC RBC-ENTMCNC: 30.9 PG
MCHC RBC-ENTMCNC: 32.3 GM/DL
MCV RBC AUTO: 95.7 FL
MONOCYTES # BLD AUTO: 1.16 K/UL
MONOCYTES NFR BLD AUTO: 10.4 %
NEUTROPHILS # BLD AUTO: 5.82 K/UL
NEUTROPHILS NFR BLD AUTO: 52.3 %
PLATELET # BLD AUTO: 326 K/UL
POTASSIUM SERPL-SCNC: 4.5 MMOL/L
PROT SERPL-MCNC: 8.2 G/DL
RBC # BLD: 4.37 M/UL
RBC # FLD: 14.9 %
SODIUM SERPL-SCNC: 137 MMOL/L
T4 FREE SERPL-MCNC: 1.6 NG/DL
WBC # FLD AUTO: 11.12 K/UL

## 2021-11-15 PROCEDURE — 99204 OFFICE O/P NEW MOD 45 MIN: CPT

## 2021-11-16 LAB
ACE BLD-CCNC: 97 U/L
TTG IGA SER IA-ACNC: <1.2 U/ML
TTG IGA SER-ACNC: NEGATIVE
TTG IGG SER IA-ACNC: 8 U/ML
TTG IGG SER IA-ACNC: ABNORMAL

## 2021-11-17 LAB
A1AT PHENOTYP SERPL-IMP: NORMAL
A1AT SERPL-MCNC: 185 MG/DL
HBV DNA # SERPL NAA+PROBE: NOT DETECTED IU/ML
HCV RNA SERPL NAA DL=5-ACNC: NOT DETECTED IU/ML
HCV RNA SERPL NAA+PROBE-LOG IU: NOT DETECTED LOG10IU/ML
HEPB DNA PCR LOG: NOT DETECTED LOG10IU/ML
HEV AB SER QL: NEGATIVE

## 2021-11-20 LAB — HEPATITIS E IGM ABY: NOT DETECTED

## 2021-11-24 ENCOUNTER — LABORATORY RESULT (OUTPATIENT)
Age: 71
End: 2021-11-24

## 2021-11-24 ENCOUNTER — APPOINTMENT (OUTPATIENT)
Dept: HEPATOLOGY | Facility: CLINIC | Age: 71
End: 2021-11-24
Payer: MEDICARE

## 2021-11-24 VITALS
RESPIRATION RATE: 13 BRPM | DIASTOLIC BLOOD PRESSURE: 90 MMHG | BODY MASS INDEX: 17.14 KG/M2 | TEMPERATURE: 97.5 F | SYSTOLIC BLOOD PRESSURE: 172 MMHG | WEIGHT: 85 LBS | OXYGEN SATURATION: 100 % | HEART RATE: 68 BPM | HEIGHT: 59 IN

## 2021-11-24 PROCEDURE — 99214 OFFICE O/P EST MOD 30 MIN: CPT

## 2021-11-25 ENCOUNTER — NON-APPOINTMENT (OUTPATIENT)
Age: 71
End: 2021-11-25

## 2021-11-25 LAB
ALBUMIN SERPL ELPH-MCNC: 4 G/DL
ALP BLD-CCNC: 151 U/L
ALT SERPL-CCNC: 136 U/L
ANION GAP SERPL CALC-SCNC: 12 MMOL/L
AST SERPL-CCNC: 66 U/L
BASOPHILS # BLD AUTO: 0 K/UL
BASOPHILS NFR BLD AUTO: 0 %
BILIRUB SERPL-MCNC: 0.5 MG/DL
BUN SERPL-MCNC: 16 MG/DL
CALCIUM SERPL-MCNC: 9.5 MG/DL
CHLORIDE SERPL-SCNC: 96 MMOL/L
CO2 SERPL-SCNC: 24 MMOL/L
CREAT SERPL-MCNC: 0.68 MG/DL
EOSINOPHIL # BLD AUTO: 0 K/UL
EOSINOPHIL NFR BLD AUTO: 0 %
HCT VFR BLD CALC: 46 %
HGB BLD-MCNC: 15.4 G/DL
LYMPHOCYTES # BLD AUTO: 4.33 K/UL
LYMPHOCYTES NFR BLD AUTO: 34.5 %
MAN DIFF?: NORMAL
MCHC RBC-ENTMCNC: 31.8 PG
MCHC RBC-ENTMCNC: 33.5 GM/DL
MCV RBC AUTO: 94.8 FL
MONOCYTES # BLD AUTO: 1.33 K/UL
MONOCYTES NFR BLD AUTO: 10.6 %
NEUTROPHILS # BLD AUTO: 6 K/UL
NEUTROPHILS NFR BLD AUTO: 47.8 %
PLATELET # BLD AUTO: 347 K/UL
POTASSIUM SERPL-SCNC: 4.8 MMOL/L
PROT SERPL-MCNC: 8 G/DL
RBC # BLD: 4.85 M/UL
RBC # FLD: 15.3 %
SODIUM SERPL-SCNC: 132 MMOL/L
WBC # FLD AUTO: 12.55 K/UL

## 2021-12-01 ENCOUNTER — NON-APPOINTMENT (OUTPATIENT)
Age: 71
End: 2021-12-01

## 2021-12-01 LAB
ALBUMIN SERPL ELPH-MCNC: 3.7 G/DL
ALP BLD-CCNC: 122 U/L
ALT SERPL-CCNC: 100 U/L
ANION GAP SERPL CALC-SCNC: 11 MMOL/L
AST SERPL-CCNC: 59 U/L
BASOPHILS # BLD AUTO: 0.01 K/UL
BASOPHILS NFR BLD AUTO: 0.1 %
BILIRUB SERPL-MCNC: 0.4 MG/DL
BUN SERPL-MCNC: 14 MG/DL
CALCIUM SERPL-MCNC: 9.4 MG/DL
CHLORIDE SERPL-SCNC: 97 MMOL/L
CO2 SERPL-SCNC: 25 MMOL/L
CREAT SERPL-MCNC: 0.65 MG/DL
EOSINOPHIL # BLD AUTO: 0.02 K/UL
EOSINOPHIL NFR BLD AUTO: 0.2 %
HCT VFR BLD CALC: 43.6 %
HGB BLD-MCNC: 14.4 G/DL
IMM GRANULOCYTES NFR BLD AUTO: 0.2 %
INR PPP: 0.98 RATIO
LYMPHOCYTES # BLD AUTO: 4.22 K/UL
LYMPHOCYTES NFR BLD AUTO: 41.4 %
MAN DIFF?: NORMAL
MCHC RBC-ENTMCNC: 31.2 PG
MCHC RBC-ENTMCNC: 33 GM/DL
MCV RBC AUTO: 94.6 FL
MONOCYTES # BLD AUTO: 0.97 K/UL
MONOCYTES NFR BLD AUTO: 9.5 %
NEUTROPHILS # BLD AUTO: 4.96 K/UL
NEUTROPHILS NFR BLD AUTO: 48.6 %
PLATELET # BLD AUTO: 242 K/UL
POTASSIUM SERPL-SCNC: 4.6 MMOL/L
PROT SERPL-MCNC: 7.3 G/DL
PT BLD: 11.6 SEC
RBC # BLD: 4.61 M/UL
RBC # FLD: 15.4 %
SARS-COV-2 N GENE NPH QL NAA+PROBE: NOT DETECTED
SODIUM SERPL-SCNC: 133 MMOL/L
WBC # FLD AUTO: 10.2 K/UL

## 2021-12-02 ENCOUNTER — NON-APPOINTMENT (OUTPATIENT)
Age: 71
End: 2021-12-02

## 2021-12-03 ENCOUNTER — APPOINTMENT (OUTPATIENT)
Dept: ULTRASOUND IMAGING | Facility: IMAGING CENTER | Age: 71
End: 2021-12-03
Payer: MEDICARE

## 2021-12-03 ENCOUNTER — OUTPATIENT (OUTPATIENT)
Dept: OUTPATIENT SERVICES | Facility: HOSPITAL | Age: 71
LOS: 1 days | End: 2021-12-03
Payer: MEDICARE

## 2021-12-03 ENCOUNTER — RESULT REVIEW (OUTPATIENT)
Age: 71
End: 2021-12-03

## 2021-12-03 DIAGNOSIS — R74.8 ABNORMAL LEVELS OF OTHER SERUM ENZYMES: ICD-10-CM

## 2021-12-03 PROCEDURE — 88307 TISSUE EXAM BY PATHOLOGIST: CPT

## 2021-12-03 PROCEDURE — 88313 SPECIAL STAINS GROUP 2: CPT | Mod: 26

## 2021-12-03 PROCEDURE — 47000 NEEDLE BIOPSY OF LIVER PERQ: CPT

## 2021-12-03 PROCEDURE — 88307 TISSUE EXAM BY PATHOLOGIST: CPT | Mod: 26

## 2021-12-03 PROCEDURE — 88313 SPECIAL STAINS GROUP 2: CPT

## 2021-12-03 PROCEDURE — 76942 ECHO GUIDE FOR BIOPSY: CPT

## 2021-12-03 PROCEDURE — 76942 ECHO GUIDE FOR BIOPSY: CPT | Mod: 26

## 2021-12-08 ENCOUNTER — APPOINTMENT (OUTPATIENT)
Dept: HEPATOLOGY | Facility: CLINIC | Age: 71
End: 2021-12-08
Payer: MEDICARE

## 2021-12-08 VITALS
DIASTOLIC BLOOD PRESSURE: 95 MMHG | HEIGHT: 59 IN | HEART RATE: 72 BPM | RESPIRATION RATE: 13 BRPM | TEMPERATURE: 97.6 F | OXYGEN SATURATION: 100 % | WEIGHT: 85 LBS | BODY MASS INDEX: 17.14 KG/M2 | SYSTOLIC BLOOD PRESSURE: 170 MMHG

## 2021-12-08 LAB
BASOPHILS # BLD AUTO: 0.02 K/UL
BASOPHILS NFR BLD AUTO: 0.2 %
EOSINOPHIL # BLD AUTO: 0.1 K/UL
EOSINOPHIL NFR BLD AUTO: 1 %
HCT VFR BLD CALC: 45.5 %
HGB BLD-MCNC: 15 G/DL
IMM GRANULOCYTES NFR BLD AUTO: 0.2 %
LYMPHOCYTES # BLD AUTO: 5 K/UL
LYMPHOCYTES NFR BLD AUTO: 52 %
MAN DIFF?: NORMAL
MCHC RBC-ENTMCNC: 31 PG
MCHC RBC-ENTMCNC: 33 GM/DL
MCV RBC AUTO: 94 FL
MONOCYTES # BLD AUTO: 0.83 K/UL
MONOCYTES NFR BLD AUTO: 8.6 %
NEUTROPHILS # BLD AUTO: 3.64 K/UL
NEUTROPHILS NFR BLD AUTO: 38 %
PLATELET # BLD AUTO: 228 K/UL
RBC # BLD: 4.84 M/UL
RBC # FLD: 14.6 %
WBC # FLD AUTO: 9.61 K/UL

## 2021-12-08 PROCEDURE — 99214 OFFICE O/P EST MOD 30 MIN: CPT

## 2021-12-09 ENCOUNTER — NON-APPOINTMENT (OUTPATIENT)
Age: 71
End: 2021-12-09

## 2021-12-09 LAB
ALBUMIN SERPL ELPH-MCNC: 3.8 G/DL
ALP BLD-CCNC: 108 U/L
ALT SERPL-CCNC: 88 U/L
ANION GAP SERPL CALC-SCNC: 10 MMOL/L
AST SERPL-CCNC: 59 U/L
BILIRUB SERPL-MCNC: 0.3 MG/DL
BUN SERPL-MCNC: 15 MG/DL
CALCIUM SERPL-MCNC: 9.4 MG/DL
CHLORIDE SERPL-SCNC: 99 MMOL/L
CO2 SERPL-SCNC: 26 MMOL/L
CREAT SERPL-MCNC: 0.69 MG/DL
POTASSIUM SERPL-SCNC: 5.2 MMOL/L
PROT SERPL-MCNC: 7.2 G/DL
SODIUM SERPL-SCNC: 135 MMOL/L
SURGICAL PATHOLOGY STUDY: SIGNIFICANT CHANGE UP

## 2021-12-14 ENCOUNTER — NON-APPOINTMENT (OUTPATIENT)
Age: 71
End: 2021-12-14

## 2021-12-23 LAB
ALBUMIN SERPL ELPH-MCNC: 3.9 G/DL
ALP BLD-CCNC: 102 U/L
ALT SERPL-CCNC: 59 U/L
ANION GAP SERPL CALC-SCNC: 12 MMOL/L
AST SERPL-CCNC: 55 U/L
BILIRUB SERPL-MCNC: 0.2 MG/DL
BUN SERPL-MCNC: 11 MG/DL
CALCIUM SERPL-MCNC: 9.5 MG/DL
CHLORIDE SERPL-SCNC: 100 MMOL/L
CO2 SERPL-SCNC: 26 MMOL/L
CREAT SERPL-MCNC: 0.68 MG/DL
POTASSIUM SERPL-SCNC: 4.4 MMOL/L
PROT SERPL-MCNC: 7.3 G/DL
SODIUM SERPL-SCNC: 137 MMOL/L

## 2021-12-30 ENCOUNTER — APPOINTMENT (OUTPATIENT)
Dept: MAMMOGRAPHY | Facility: IMAGING CENTER | Age: 71
End: 2021-12-30

## 2022-01-11 ENCOUNTER — NON-APPOINTMENT (OUTPATIENT)
Age: 72
End: 2022-01-11

## 2022-01-12 LAB
ALBUMIN SERPL ELPH-MCNC: 3.8 G/DL
ALBUMIN SERPL ELPH-MCNC: 4 G/DL
ALP BLD-CCNC: 114 U/L
ALP BLD-CCNC: 115 U/L
ALT SERPL-CCNC: 34 U/L
ALT SERPL-CCNC: 37 U/L
ANION GAP SERPL CALC-SCNC: 10 MMOL/L
ANION GAP SERPL CALC-SCNC: 9 MMOL/L
AST SERPL-CCNC: 45 U/L
AST SERPL-CCNC: 69 U/L
BILIRUB SERPL-MCNC: 0.2 MG/DL
BILIRUB SERPL-MCNC: 0.2 MG/DL
BUN SERPL-MCNC: 11 MG/DL
BUN SERPL-MCNC: 9 MG/DL
CALCIUM SERPL-MCNC: 9.1 MG/DL
CALCIUM SERPL-MCNC: 9.3 MG/DL
CHLORIDE SERPL-SCNC: 98 MMOL/L
CHLORIDE SERPL-SCNC: 99 MMOL/L
CO2 SERPL-SCNC: 25 MMOL/L
CO2 SERPL-SCNC: 27 MMOL/L
CREAT SERPL-MCNC: 0.64 MG/DL
CREAT SERPL-MCNC: 0.66 MG/DL
GLUCOSE SERPL-MCNC: 123 MG/DL
GLUCOSE SERPL-MCNC: 124 MG/DL
POTASSIUM SERPL-SCNC: 4 MMOL/L
POTASSIUM SERPL-SCNC: 7.6 MMOL/L
PROT SERPL-MCNC: 7.3 G/DL
PROT SERPL-MCNC: 7.8 G/DL
SODIUM SERPL-SCNC: 132 MMOL/L
SODIUM SERPL-SCNC: 136 MMOL/L

## 2022-01-13 ENCOUNTER — NON-APPOINTMENT (OUTPATIENT)
Age: 72
End: 2022-01-13

## 2022-01-13 RX ORDER — SODIUM POLYSTYRENE SULFONATE 15 G/60ML
15 SUSPENSION ORAL; RECTAL
Qty: 120 | Refills: 0 | Status: DISCONTINUED | COMMUNITY
Start: 2022-01-11 | End: 2022-01-13

## 2022-01-26 ENCOUNTER — APPOINTMENT (OUTPATIENT)
Dept: HEPATOLOGY | Facility: CLINIC | Age: 72
End: 2022-01-26
Payer: MEDICARE

## 2022-01-26 VITALS
HEIGHT: 59 IN | DIASTOLIC BLOOD PRESSURE: 81 MMHG | TEMPERATURE: 98.6 F | WEIGHT: 84 LBS | OXYGEN SATURATION: 99 % | BODY MASS INDEX: 16.93 KG/M2 | RESPIRATION RATE: 13 BRPM | HEART RATE: 75 BPM | SYSTOLIC BLOOD PRESSURE: 139 MMHG

## 2022-01-26 PROCEDURE — 99214 OFFICE O/P EST MOD 30 MIN: CPT

## 2022-01-31 LAB — SARS-COV-2 N GENE NPH QL NAA+PROBE: NOT DETECTED

## 2022-02-01 LAB
ALBUMIN SERPL ELPH-MCNC: 3.9 G/DL
ALP BLD-CCNC: 180 U/L
ALT SERPL-CCNC: 47 U/L
ANION GAP SERPL CALC-SCNC: 11 MMOL/L
AST SERPL-CCNC: 51 U/L
BILIRUB SERPL-MCNC: 0.2 MG/DL
BUN SERPL-MCNC: 9 MG/DL
CALCIUM SERPL-MCNC: 9.7 MG/DL
CHLORIDE SERPL-SCNC: 98 MMOL/L
CO2 SERPL-SCNC: 24 MMOL/L
CREAT SERPL-MCNC: 0.6 MG/DL
CRP SERPL-MCNC: 17 MG/L
POTASSIUM SERPL-SCNC: 4.2 MMOL/L
PROT SERPL-MCNC: 7.6 G/DL
SODIUM SERPL-SCNC: 133 MMOL/L

## 2022-02-02 LAB — ERYTHROCYTE [SEDIMENTATION RATE] IN BLOOD BY WESTERGREN METHOD: 59 MM/HR

## 2022-02-09 ENCOUNTER — NON-APPOINTMENT (OUTPATIENT)
Age: 72
End: 2022-02-09

## 2022-02-09 LAB
ALBUMIN SERPL ELPH-MCNC: 3.9 G/DL
ALP BLD-CCNC: 146 U/L
ALT SERPL-CCNC: 33 U/L
ANION GAP SERPL CALC-SCNC: 13 MMOL/L
AST SERPL-CCNC: 40 U/L
BILIRUB SERPL-MCNC: 0.2 MG/DL
BUN SERPL-MCNC: 12 MG/DL
CALCIUM SERPL-MCNC: 9.5 MG/DL
CHLORIDE SERPL-SCNC: 102 MMOL/L
CO2 SERPL-SCNC: 24 MMOL/L
CREAT SERPL-MCNC: 0.7 MG/DL
POTASSIUM SERPL-SCNC: 4.6 MMOL/L
PROT SERPL-MCNC: 7.5 G/DL
SODIUM SERPL-SCNC: 138 MMOL/L

## 2022-02-22 ENCOUNTER — NON-APPOINTMENT (OUTPATIENT)
Age: 72
End: 2022-02-22

## 2022-02-22 LAB
ALBUMIN SERPL ELPH-MCNC: 4.1 G/DL
ALP BLD-CCNC: 128 U/L
ALT SERPL-CCNC: 26 U/L
ANION GAP SERPL CALC-SCNC: 12 MMOL/L
AST SERPL-CCNC: 38 U/L
BILIRUB SERPL-MCNC: 0.3 MG/DL
BUN SERPL-MCNC: 10 MG/DL
CALCIUM SERPL-MCNC: 9.5 MG/DL
CHLORIDE SERPL-SCNC: 100 MMOL/L
CO2 SERPL-SCNC: 25 MMOL/L
CREAT SERPL-MCNC: 0.6 MG/DL
POTASSIUM SERPL-SCNC: 4.4 MMOL/L
PROT SERPL-MCNC: 7.3 G/DL
SODIUM SERPL-SCNC: 137 MMOL/L

## 2022-03-02 ENCOUNTER — APPOINTMENT (OUTPATIENT)
Dept: HEPATOLOGY | Facility: CLINIC | Age: 72
End: 2022-03-02
Payer: MEDICARE

## 2022-03-02 VITALS
RESPIRATION RATE: 13 BRPM | BODY MASS INDEX: 17.34 KG/M2 | WEIGHT: 86 LBS | OXYGEN SATURATION: 97 % | HEIGHT: 59 IN | SYSTOLIC BLOOD PRESSURE: 123 MMHG | HEART RATE: 65 BPM | TEMPERATURE: 97.6 F | DIASTOLIC BLOOD PRESSURE: 67 MMHG

## 2022-03-02 PROCEDURE — 99214 OFFICE O/P EST MOD 30 MIN: CPT

## 2022-03-02 RX ORDER — PREDNISONE 5 MG/1
5 TABLET ORAL
Qty: 240 | Refills: 0 | Status: DISCONTINUED | COMMUNITY
Start: 2021-11-12 | End: 2022-03-02

## 2022-03-02 RX ORDER — PREDNISONE 2.5 MG/1
2.5 TABLET ORAL
Qty: 90 | Refills: 3 | Status: DISCONTINUED | COMMUNITY
Start: 2021-12-09 | End: 2022-03-02

## 2022-04-21 LAB
ALBUMIN SERPL ELPH-MCNC: 4 G/DL
ALP BLD-CCNC: 115 U/L
ALT SERPL-CCNC: 19 U/L
ANION GAP SERPL CALC-SCNC: 11 MMOL/L
AST SERPL-CCNC: 35 U/L
BILIRUB SERPL-MCNC: 0.2 MG/DL
BUN SERPL-MCNC: 13 MG/DL
CALCIUM SERPL-MCNC: 9.8 MG/DL
CHLORIDE SERPL-SCNC: 100 MMOL/L
CO2 SERPL-SCNC: 24 MMOL/L
CREAT SERPL-MCNC: 0.62 MG/DL
EGFR: 95 ML/MIN/1.73M2
POTASSIUM SERPL-SCNC: 4.5 MMOL/L
PROT SERPL-MCNC: 8.1 G/DL
SODIUM SERPL-SCNC: 134 MMOL/L

## 2022-08-01 ENCOUNTER — APPOINTMENT (OUTPATIENT)
Dept: HEPATOLOGY | Facility: CLINIC | Age: 72
End: 2022-08-01

## 2022-08-01 VITALS
DIASTOLIC BLOOD PRESSURE: 83 MMHG | WEIGHT: 81 LBS | RESPIRATION RATE: 12 BRPM | SYSTOLIC BLOOD PRESSURE: 176 MMHG | BODY MASS INDEX: 17 KG/M2 | TEMPERATURE: 97.9 F | HEIGHT: 58 IN | HEART RATE: 59 BPM | OXYGEN SATURATION: 100 %

## 2022-08-01 DIAGNOSIS — Z01.818 ENCOUNTER FOR OTHER PREPROCEDURAL EXAMINATION: ICD-10-CM

## 2022-08-01 DIAGNOSIS — R74.8 ABNORMAL LEVELS OF OTHER SERUM ENZYMES: ICD-10-CM

## 2022-08-01 DIAGNOSIS — R63.6 UNDERWEIGHT: ICD-10-CM

## 2022-08-01 DIAGNOSIS — M06.9 RHEUMATOID ARTHRITIS, UNSPECIFIED: ICD-10-CM

## 2022-08-01 PROCEDURE — 99214 OFFICE O/P EST MOD 30 MIN: CPT

## 2022-08-01 RX ORDER — PREDNISONE 1 MG/1
1 TABLET ORAL
Qty: 90 | Refills: 3 | Status: DISCONTINUED | COMMUNITY
Start: 2022-01-26 | End: 2022-08-01

## 2022-08-01 NOTE — HISTORY OF PRESENT ILLNESS
[de-identified] : Ms. Golden is a 73 yo F with underweight BMI, chronic GERD, hypertension, prediabetes (with HbA1c 5.8% on 5/14/22), and rheumatoid arthritis (with a remote history of methotrexate use for ~1 year in the 1990s, now on Enbrel since 2007), who is being seen for follow-up of autoimune hepatitis (AIH). She was first noted to have elevated liver enzymes without jaundice in 9/2021 (previously had been within normal limits as recently as 7/27/21), with laboratory work-up notable for positive SHO with 1:1280 titer and homogeneous pattern and elevated IgG, with other autoimmune serologies all negative (ASMA, SLA, and LKM). She was started on prednisone at 40 mg po daily on 11/12/21 with subsequent taper. She underwent a percutaneous liver biopsy on 12/3/21 with findings suggestive of resolving AIH with focal bridging fibrosis. Azathioprine was added as a steroid-sparing therapy in mid-12/2021 but she was intolerant due to GI side effects and it was discontinued on 1/31/21. Prednisone was tapered off in mid-2/2022 and she has been off immunosuppression since then. Her most recent liver chemistries were within normal limits on 4/20/22 and 5/14/22, though IgG was not re-checked.\par \par She was last seen by hepatologist Dr. Siddhartha Rayo on 3/2/22 and is being seen today to transition her hepatology care as he is no longer with this practice.\par \par PCP: Dr. Susannah Kamara\par GI: Dr. Thai Alvarez\par Rheumatology: Dr. Turpin\par \par Today, she reports feeling well overall. She says that near the time she first developed elevated liver enzymes in 9/2021, her rheumatologist had her stop sulindac and diclofenac. She drinks kale and cucumber juice that she makes herself. No protein powders as she was worried that the sodium content could increase her blood pressure. She denies use of herbal/dietary supplements.\par \par She has no known family history of liver disease.

## 2022-08-01 NOTE — CONSULT LETTER
[Dear  ___] : Dear  [unfilled], [Courtesy Letter:] : I had the pleasure of seeing your patient, [unfilled], in my office today. [Please see my note below.] : Please see my note below. [Consult Closing:] : Thank you very much for allowing me to participate in the care of this patient.  If you have any questions, please do not hesitate to contact me. [FreeTextEntry2] : Dr. Thai Alvarez [FreeTextEntry3] : Sincerely,\par \par Eric Polanco M.D., Ph.D.\par Director, Women's Liver Health Program, [facility]\par Transplant Hepatology, [facility]\par  of Medicine\par [employer] [facility] [employer] [facility]\par [address]\[place] [address]\par Tel: [phone]\par Fax: [phone]\par Cell: [phone]\par E-mail: [email or web address]

## 2022-08-01 NOTE — ASSESSMENT
[FreeTextEntry1] : 71 yo F with underweight BMI, chronic GERD, hypertension, prediabetes (with HbA1c 5.8% on 5/14/22), and rheumatoid arthritis (with a remote history of methotrexate use for ~1 year in the 1990s, now on Enbrel since 2007), who is being seen for follow-up of autoimune hepatitis (AIH). She was first noted to have elevated liver enzymes without jaundice in 9/2021 (previously had been within normal limits as recently as 7/27/21), with laboratory work-up notable for positive SHO with 1:1280 titer and homogeneous pattern and elevated IgG, with other autoimmune serologies all negative (ASMA, SLA, and LKM). It is possible that the liver injury was triggered by one of the NSAIDs she had been using that were subsequently discontinued.\par \par She was started on prednisone at 40 mg po daily on 11/12/21 with subsequent taper. She underwent a percutaneous liver biopsy on 12/3/21 with findings suggestive of resolving AIH with focal bridging fibrosis. Azathioprine was added as a steroid-sparing therapy in mid-12/2021 but she was intolerant due to GI side effects and it was discontinued on 1/31/21. Prednisone was tapered off in mid-2/2022 and she has been off immunosuppression since then.\par \par Her most recent liver chemistries were within normal limits on 4/20/22 and 5/14/22, though IgG was not re-checked. We will re-check her liver chemistries and IgG today for trends.\par \par I discussed with her that, even assuming that her liver chemistries remain normal and IgG has normalized, she will need continued monitoring of her liver tests every 3 months for at least the next 2 years as inflammation may recur, particularly if this was de rip AIH rather than triggered by a medication. If she has recurrent signs of inflammation, we may need to resume immunosuppression.\par \par Given prior biopsy evidence of focal bridging fibrosis, we will plan for a repeat FibroScan with her next visit in 3 months for non-invasive re-staging of fibrosis. She may need eventual re-biopsy pending the results of her labs and other non-invasive testing. Repeat abdominal US also ordered given advanced hepatic fibrosis on the prior biopsy, for q6 month surveillance to rule out morphologic cirrhosis or hepatocellular carcinoma (HCC).\par \par At next visit, will additionally check: HAV IgG, HBcAb, and HBsAb (to assess for need for vaccinations) and celiac cascade (given prior weakly positive anti-TTG IgG though anti-TTG IgA was negative).\par \par Blood pressure was elevated at today's visit. I advised her to log her home blood pressures daily and to follow-up with her PCP in case her medications need to be re-adjusted.\par \par Next follow-up: 3 months, with same-day FibroScan

## 2022-08-02 LAB
AFP-TM SERPL-MCNC: 1.9 NG/ML
ALBUMIN SERPL ELPH-MCNC: 4.1 G/DL
ALP BLD-CCNC: 110 U/L
ALT SERPL-CCNC: 10 U/L
ANION GAP SERPL CALC-SCNC: 12 MMOL/L
AST SERPL-CCNC: 27 U/L
BASOPHILS # BLD AUTO: 0.04 K/UL
BASOPHILS NFR BLD AUTO: 0.6 %
BILIRUB SERPL-MCNC: 0.3 MG/DL
BUN SERPL-MCNC: 8 MG/DL
CALCIUM SERPL-MCNC: 9.4 MG/DL
CHLORIDE SERPL-SCNC: 99 MMOL/L
CO2 SERPL-SCNC: 23 MMOL/L
CREAT SERPL-MCNC: 0.65 MG/DL
EGFR: 93 ML/MIN/1.73M2
EOSINOPHIL # BLD AUTO: 0.09 K/UL
EOSINOPHIL NFR BLD AUTO: 1.3 %
GLUCOSE SERPL-MCNC: 96 MG/DL
HCT VFR BLD CALC: 39.4 %
HGB BLD-MCNC: 12.8 G/DL
IGG SER QL IEP: 1888 MG/DL
IMM GRANULOCYTES NFR BLD AUTO: 0.1 %
INR PPP: 1.08 RATIO
LYMPHOCYTES # BLD AUTO: 3.23 K/UL
LYMPHOCYTES NFR BLD AUTO: 48.3 %
MAN DIFF?: NORMAL
MCHC RBC-ENTMCNC: 29.2 PG
MCHC RBC-ENTMCNC: 32.5 GM/DL
MCV RBC AUTO: 89.7 FL
MONOCYTES # BLD AUTO: 0.89 K/UL
MONOCYTES NFR BLD AUTO: 13.3 %
NEUTROPHILS # BLD AUTO: 2.43 K/UL
NEUTROPHILS NFR BLD AUTO: 36.4 %
PLATELET # BLD AUTO: 279 K/UL
POTASSIUM SERPL-SCNC: 4.3 MMOL/L
PROT SERPL-MCNC: 7.9 G/DL
PT BLD: 12.7 SEC
RBC # BLD: 4.39 M/UL
RBC # FLD: 14.1 %
SODIUM SERPL-SCNC: 134 MMOL/L
WBC # FLD AUTO: 6.69 K/UL

## 2022-08-05 NOTE — ED CDU PROVIDER SUBSEQUENT DAY NOTE - HISTORY
SDIDHARTH Couch: Patient seen at bedside in NAD.  VSS.  Patient resting comfortably without complaints. Patient seen by cardiology nocturnist Dr. Carlos who stated tachycardia likely due to bb withdrawal rebound therefore to begin 50mg metoprol BID for improved HR control goal <100 sitting. Patient heart rate currently 88bpm seen on tele and /66 after receiving metop 50mg. Will hold further BB use until after nuc stress [Time Spent: ___ minutes] : I have spent [unfilled] minutes of time on the encounter.

## 2022-09-29 ENCOUNTER — APPOINTMENT (OUTPATIENT)
Dept: HEPATOLOGY | Facility: CLINIC | Age: 72
End: 2022-09-29

## 2022-11-22 LAB
ALBUMIN SERPL ELPH-MCNC: 3.8 G/DL
ALP BLD-CCNC: 104 U/L
ALT SERPL-CCNC: 12 U/L
ANION GAP SERPL CALC-SCNC: 9 MMOL/L
AST SERPL-CCNC: 25 U/L
BASOPHILS # BLD AUTO: 0.04 K/UL
BASOPHILS NFR BLD AUTO: 0.6 %
BILIRUB SERPL-MCNC: 0.3 MG/DL
BUN SERPL-MCNC: 10 MG/DL
CALCIUM SERPL-MCNC: 9.5 MG/DL
CHLORIDE SERPL-SCNC: 100 MMOL/L
CO2 SERPL-SCNC: 24 MMOL/L
CREAT SERPL-MCNC: 0.64 MG/DL
EGFR: 94 ML/MIN/1.73M2
EOSINOPHIL # BLD AUTO: 0.08 K/UL
EOSINOPHIL NFR BLD AUTO: 1.2 %
GGT SERPL-CCNC: 14 U/L
GLUCOSE SERPL-MCNC: 98 MG/DL
HCT VFR BLD CALC: 41.4 %
HGB BLD-MCNC: 13.5 G/DL
IGG SER QL IEP: 1974 MG/DL
IMM GRANULOCYTES NFR BLD AUTO: 0 %
INR PPP: 1.05 RATIO
LYMPHOCYTES # BLD AUTO: 3.45 K/UL
LYMPHOCYTES NFR BLD AUTO: 51.7 %
MAN DIFF?: NORMAL
MCHC RBC-ENTMCNC: 30 PG
MCHC RBC-ENTMCNC: 32.6 GM/DL
MCV RBC AUTO: 92 FL
MONOCYTES # BLD AUTO: 0.81 K/UL
MONOCYTES NFR BLD AUTO: 12.1 %
NEUTROPHILS # BLD AUTO: 2.29 K/UL
NEUTROPHILS NFR BLD AUTO: 34.4 %
PLATELET # BLD AUTO: 313 K/UL
POTASSIUM SERPL-SCNC: 5.2 MMOL/L
PROT SERPL-MCNC: 7.7 G/DL
PT BLD: 12.2 SEC
RBC # BLD: 4.5 M/UL
RBC # FLD: 13.3 %
SODIUM SERPL-SCNC: 134 MMOL/L
WBC # FLD AUTO: 6.67 K/UL

## 2022-11-30 ENCOUNTER — APPOINTMENT (OUTPATIENT)
Dept: HEPATOLOGY | Facility: CLINIC | Age: 72
End: 2022-11-30

## 2022-11-30 VITALS
BODY MASS INDEX: 17.42 KG/M2 | TEMPERATURE: 97.5 F | DIASTOLIC BLOOD PRESSURE: 75 MMHG | HEIGHT: 58 IN | RESPIRATION RATE: 12 BRPM | WEIGHT: 83 LBS | HEART RATE: 66 BPM | OXYGEN SATURATION: 99 % | SYSTOLIC BLOOD PRESSURE: 139 MMHG

## 2022-11-30 PROCEDURE — 99214 OFFICE O/P EST MOD 30 MIN: CPT | Mod: 25

## 2022-11-30 PROCEDURE — 91200 LIVER ELASTOGRAPHY: CPT

## 2022-11-30 RX ORDER — OLMESARTAN MEDOXOMIL 20 MG/1
20 TABLET, FILM COATED ORAL
Qty: 30 | Refills: 0 | Status: DISCONTINUED | COMMUNITY
Start: 2022-07-07 | End: 2022-11-30

## 2022-11-30 NOTE — ASSESSMENT
[FreeTextEntry1] : 73 yo F with underweight BMI, chronic GERD, hypertension, prediabetes (with HbA1c 5.8% on 5/14/22), and rheumatoid arthritis (with a remote history of methotrexate use for ~1 year in the 1990s, now on Enbrel since 2007), who is being seen for follow-up of autoimune hepatitis (AIH). She was first noted to have elevated liver enzymes without jaundice in 9/2021 (previously had been within normal limits as recently as 7/27/21), with laboratory work-up notable for positive SHO with 1:1280 titer and homogeneous pattern and elevated IgG, with other autoimmune serologies all negative (ASMA, SLA, and LKM). It is possible that the liver injury was triggered by one of the NSAIDs she had been using that were subsequently discontinued.\par \par She was started on prednisone at 40 mg po daily on 11/12/21 with subsequent taper. She underwent a percutaneous liver biopsy on 12/3/21 with findings suggestive of resolving AIH with focal bridging fibrosis. Azathioprine was added as a steroid-sparing therapy in mid-12/2021 but she was intolerant due to GI side effects and it was discontinued on 1/31/22. Prednisone was tapered off in mid-2/2022 and she has been off immunosuppression since then.\par \par Her most recent liver chemistries (11/21/22) remain within normal limits, though IgG is mildly elevated as on prior labs on 8/1/22. Because IgG is not liver-specific, it is unclear whether this reflects some degree of residual low level hepatic inflammatory activity or not. Her normal FibroScan measurements today suggest interval improvement compared to her 12/2021 biopsy and suggest against significant residual inflammation.\par \par Given the above findings, we will continue with a strategy of monitoring of her liver chemistries off immunosuppression, especially as her prior liver injury may have been drug-related rather than idiopathic autoimmune hepatitis. We will resume immunosuppression only if liver enzyme elevations recur. Repeat labs ordered for 3 months (2/2022) and 6 months (5/2022).\par \par She was encouraged to follow-up with her gastroenterologists for ongoing management of her dyspepsia. Reassurance was provided that given her normal liver chemistries, it is highly unlikely that her liver is the cause of her inability to regain weight. She was encouraged to increase her consumption of lean proteins.\par \par Next follow-up: 6 months

## 2022-11-30 NOTE — CONSULT LETTER
[Dear  ___] : Dear  [unfilled], [Courtesy Letter:] : I had the pleasure of seeing your patient, [unfilled], in my office today. [Please see my note below.] : Please see my note below. [Consult Closing:] : Thank you very much for allowing me to participate in the care of this patient.  If you have any questions, please do not hesitate to contact me. [FreeTextEntry2] : Dr. Thai Alvarez [FreeTextEntry3] : Sincerely,\par \par Eric Polanco M.D., Ph.D.\par Director, Women's Liver Health Program, [facility]\par Transplant Hepatology, [facility]\par  of Medicine\par [employer] [facility] [employer] [facility] at [facility]\par [address]\[place] [address]\par Tel: [phone]\par Fax: [phone]\par Cell: [phone]\par E-mail: [email or web address]

## 2022-11-30 NOTE — HISTORY OF PRESENT ILLNESS
[de-identified] : Ms. Golden is a 73 yo F with underweight BMI, chronic GERD, hypertension, prediabetes (with HbA1c 5.8% on 5/14/22), and rheumatoid arthritis (with a remote history of methotrexate use for ~1 year in the 1990s, now on Enbrel since 2007), who is being seen for follow-up of autoimmune hepatitis (AIH). She was first noted to have elevated liver enzymes without jaundice in 9/2021 (previously had been within normal limits as recently as 7/27/21), with laboratory work-up notable for positive SHO with 1:1280 titer and homogeneous pattern and elevated IgG, with other autoimmune serologies all negative (ASMA, SLA, and LKM). She was started on prednisone at 40 mg po daily on 11/12/21 with subsequent taper. She underwent a percutaneous liver biopsy on 12/3/21 with findings suggestive of resolving AIH with focal bridging fibrosis. Azathioprine was added as a steroid-sparing therapy in mid-12/2021 but she was intolerant due to GI side effects and it was discontinued on 1/31/22. Prednisone was tapered off in mid-2/2022 and she has been off immunosuppression since then. Near the time she first developed elevated liver enzymes in 9/2021, her rheumatologist had her stop sulindac and diclofenac.\par \par PCP: Dr. Susannah Kamara\par GI: Dr. Thai Alvarez, Dr. Fahad Galicia\par Rheumatology: Dr. Turpin\par \par Liver, random, ultrasound guided core biopsy (12/3/21):\par - Features of resolving acute or subacute hepatitis, see comment.\par - Portal fibrous expansion with focal bridging fibrosis.\par - Minimal macrovesicular steatosis.\par - Comment: The biopsy consists of a single core of hepatic parenchyma with largely intact architecture as supported by trichrome stain. The portal tracts are expanded by mild chronic infiltrates composed predominantly of mature-appearing lymphocytes, clusters of ceroid laden macrophages, scattered plasma cells, and neutrophils. Mild interface activity is present. The interlobular bile ducts are mostly intact with focal senescence. The lobular parenchyma shows mild lobular inflammation in the form of clusters of ceroid laden macrophages and lymphocytes. Acidophil bodies are present. There is minimal macrovesicular steatosis (less than 5% area). There is no evidence of cholestasis. PAS-D stain highlights the periportal and parenchymal macrophages, which occasionally are in aggregates, indicative of relatively recent contiguous hepatocyte loss. Trichrome stain shows fibrous portal expansion with focal bridging fibrosis. Reticulin demonstrates foci of hepatocellular collapse. Iron stain shows scattered iron storage in Kupffer cells. Overall findings are portal and lobular inflammation with ceroid laden macrophages and mild interface hepatitis. The patient's history of elevated liver function and SHO positivity is noted. These findings most likely represent treated autoimmune hepatitis. Clinical correlation is recommended.\par \par FibroScan (11/30/22): Median liver stiffness 5.3 kPA (normal, consistent with F0-1 fibrosis) and CAP score 214 dB/m (normal, consistent with S0 steatosis).\par \par She was last seen by me on 8/1/22 and is here today for routine follow-up, FibroScan (above), and to discuss her recent lab results. Today, she reports feeling well overall apart from intermittent burning discomfort in her upper abdomen if she eats certain foods such as fatty foods. She recently saw a dietician (Dr. Mata) because of her difficulty regaining weight and says that Dr. Mata thought this may be related to her liver disease.\par \par She denies use of herbal/dietary supplements.\par \par She has no known family history of liver disease.

## 2022-12-05 ENCOUNTER — APPOINTMENT (OUTPATIENT)
Dept: MAMMOGRAPHY | Facility: IMAGING CENTER | Age: 72
End: 2022-12-05

## 2022-12-05 ENCOUNTER — APPOINTMENT (OUTPATIENT)
Dept: RADIOLOGY | Facility: IMAGING CENTER | Age: 72
End: 2022-12-05

## 2022-12-22 ENCOUNTER — APPOINTMENT (OUTPATIENT)
Dept: MAMMOGRAPHY | Facility: IMAGING CENTER | Age: 72
End: 2022-12-22

## 2022-12-22 ENCOUNTER — APPOINTMENT (OUTPATIENT)
Dept: RADIOLOGY | Facility: IMAGING CENTER | Age: 72
End: 2022-12-22

## 2023-01-17 ENCOUNTER — NON-APPOINTMENT (OUTPATIENT)
Age: 73
End: 2023-01-17

## 2023-01-18 ENCOUNTER — NON-APPOINTMENT (OUTPATIENT)
Age: 73
End: 2023-01-18

## 2023-02-22 ENCOUNTER — LABORATORY RESULT (OUTPATIENT)
Age: 73
End: 2023-02-22

## 2023-02-24 ENCOUNTER — NON-APPOINTMENT (OUTPATIENT)
Age: 73
End: 2023-02-24

## 2023-02-27 LAB
ALBUMIN SERPL ELPH-MCNC: 4 G/DL
ALP BLD-CCNC: 101 U/L
ALT SERPL-CCNC: 15 U/L
ANION GAP SERPL CALC-SCNC: 12 MMOL/L
AST SERPL-CCNC: 27 U/L
BASOPHILS # BLD AUTO: 0.04 K/UL
BASOPHILS NFR BLD AUTO: 0.7 %
BILIRUB SERPL-MCNC: 0.2 MG/DL
BUN SERPL-MCNC: 14 MG/DL
CALCIUM SERPL-MCNC: 9.7 MG/DL
CHLORIDE SERPL-SCNC: 100 MMOL/L
CO2 SERPL-SCNC: 24 MMOL/L
CREAT SERPL-MCNC: 0.7 MG/DL
EGFR: 92 ML/MIN/1.73M2
EOSINOPHIL # BLD AUTO: 0.15 K/UL
EOSINOPHIL NFR BLD AUTO: 2.8 %
GLUCOSE SERPL-MCNC: 103 MG/DL
HCT VFR BLD CALC: 38.7 %
HGB BLD-MCNC: 12.7 G/DL
IGG SER QL IEP: 1381 MG/DL
IMM GRANULOCYTES NFR BLD AUTO: 0 %
LYMPHOCYTES # BLD AUTO: 3.53 K/UL
LYMPHOCYTES NFR BLD AUTO: 65 %
MAN DIFF?: NORMAL
MCHC RBC-ENTMCNC: 30.3 PG
MCHC RBC-ENTMCNC: 32.8 GM/DL
MCV RBC AUTO: 92.4 FL
MONOCYTES # BLD AUTO: 0.76 K/UL
MONOCYTES NFR BLD AUTO: 14 %
NEUTROPHILS # BLD AUTO: 0.95 K/UL
NEUTROPHILS NFR BLD AUTO: 17.5 %
PLATELET # BLD AUTO: 333 K/UL
POTASSIUM SERPL-SCNC: 5.7 MMOL/L
PROT SERPL-MCNC: 7.4 G/DL
RBC # BLD: 4.19 M/UL
RBC # FLD: 13.7 %
SODIUM SERPL-SCNC: 136 MMOL/L
WBC # FLD AUTO: 5.43 K/UL

## 2023-05-23 LAB
ALBUMIN SERPL ELPH-MCNC: 4.2 G/DL
ALP BLD-CCNC: 118 U/L
ALT SERPL-CCNC: 14 U/L
ANION GAP SERPL CALC-SCNC: 15 MMOL/L
AST SERPL-CCNC: 27 U/L
BILIRUB SERPL-MCNC: 0.3 MG/DL
BUN SERPL-MCNC: 14 MG/DL
CALCIUM SERPL-MCNC: 9.5 MG/DL
CHLORIDE SERPL-SCNC: 99 MMOL/L
CO2 SERPL-SCNC: 21 MMOL/L
CREAT SERPL-MCNC: 0.62 MG/DL
EGFR: 95 ML/MIN/1.73M2
GLUCOSE SERPL-MCNC: 114 MG/DL
IGG SER QL IEP: 1826 MG/DL
POTASSIUM SERPL-SCNC: 4.5 MMOL/L
PROT SERPL-MCNC: 8.2 G/DL
SODIUM SERPL-SCNC: 135 MMOL/L

## 2023-06-05 ENCOUNTER — APPOINTMENT (OUTPATIENT)
Dept: HEPATOLOGY | Facility: CLINIC | Age: 73
End: 2023-06-05

## 2023-07-11 ENCOUNTER — APPOINTMENT (OUTPATIENT)
Dept: HEPATOLOGY | Facility: CLINIC | Age: 73
End: 2023-07-11
Payer: MEDICARE

## 2023-07-11 VITALS
HEART RATE: 63 BPM | DIASTOLIC BLOOD PRESSURE: 74 MMHG | TEMPERATURE: 97.2 F | RESPIRATION RATE: 14 BRPM | WEIGHT: 90 LBS | OXYGEN SATURATION: 100 % | HEIGHT: 58 IN | SYSTOLIC BLOOD PRESSURE: 145 MMHG | BODY MASS INDEX: 18.89 KG/M2

## 2023-07-11 DIAGNOSIS — Z00.00 ENCOUNTER FOR GENERAL ADULT MEDICAL EXAMINATION W/OUT ABNORMAL FINDINGS: ICD-10-CM

## 2023-07-11 DIAGNOSIS — R10.13 EPIGASTRIC PAIN: ICD-10-CM

## 2023-07-11 DIAGNOSIS — I10 ESSENTIAL (PRIMARY) HYPERTENSION: ICD-10-CM

## 2023-07-11 DIAGNOSIS — K75.4 AUTOIMMUNE HEPATITIS: ICD-10-CM

## 2023-07-11 PROCEDURE — 99213 OFFICE O/P EST LOW 20 MIN: CPT

## 2023-07-11 RX ORDER — ETANERCEPT 50 MG/ML
50 SOLUTION SUBCUTANEOUS
Refills: 0 | Status: ACTIVE | COMMUNITY
Start: 2023-07-11

## 2023-07-11 RX ORDER — SULINDAC 150 MG/1
150 TABLET ORAL
Refills: 0 | Status: ACTIVE | COMMUNITY
Start: 2023-07-11

## 2023-07-11 RX ORDER — AMLODIPINE BESYLATE 5 MG/1
5 TABLET ORAL DAILY
Refills: 0 | Status: ACTIVE | COMMUNITY
Start: 2022-11-14

## 2023-07-11 RX ORDER — METOPROLOL SUCCINATE 100 MG/1
100 TABLET, EXTENDED RELEASE ORAL
Refills: 0 | Status: ACTIVE | COMMUNITY
Start: 2021-07-31

## 2023-07-11 RX ORDER — ETANERCEPT 25 MG
25 KIT SUBCUTANEOUS
Refills: 0 | Status: DISCONTINUED | COMMUNITY
End: 2023-07-11

## 2023-07-11 RX ORDER — PANTOPRAZOLE 20 MG/1
20 TABLET, DELAYED RELEASE ORAL
Refills: 0 | Status: ACTIVE | COMMUNITY
Start: 2021-11-12

## 2023-07-11 RX ORDER — ALUMINUM HYDROXIDE, MAGNESIUM HYDROXIDE, SIMETHICONE 200; 200; 25 MG/1; MG/1; MG/1
200-200-25 TABLET, CHEWABLE ORAL
Refills: 0 | Status: ACTIVE | COMMUNITY

## 2023-07-11 NOTE — REVIEW OF SYSTEMS
[Negative] : Heme/Lymph [As Noted in HPI] : as noted in HPI [Arthralgias] : arthralgias [Joint Swelling] : joint swelling [Joint Stiffness] : joint stiffness

## 2023-07-14 NOTE — ASSESSMENT
[FreeTextEntry1] : 74 yo F with underweight BMI, chronic GERD, hypertension, prediabetes (with HbA1c 5.8% on 5/14/22), and rheumatoid arthritis (with a remote history of methotrexate use for ~1 year in the 1990s, now on Enbrel since 2007), who is being seen for follow-up of autoimune hepatitis (AIH). She was first noted to have elevated liver enzymes without jaundice in 9/2021 (previously had been within normal limits as recently as 7/27/21), with laboratory work-up notable for positive SHO with 1:1280 titer and homogeneous pattern and elevated IgG, with other autoimmune serologies all negative (ASMA, SLA, and LKM). It is possible that the liver injury was triggered by one of the NSAIDs she had been using that were subsequently discontinued.\par \par She was started on prednisone at 40 mg po daily on 11/12/21 with subsequent taper. She underwent a percutaneous liver biopsy on 12/3/21 with findings suggestive of resolving AIH with focal bridging fibrosis. Azathioprine was added as a steroid-sparing therapy in mid-12/2021 but she was intolerant due to GI side effects and it was discontinued on 1/31/22. Prednisone was tapered off in mid-2/2022 and she has been off immunosuppression since then.\par \par # AIH:\par - Most recent liver chemistries (7/2/23) remain normal while off immunosuppression. \par - Continue monitoring liver chemistries q3 months, next due 10/2023. Will discuss resuming immunosuppression only if liver enzyme elevations recur. \par - Repeat FibroScan in 1 year to re-stage fibrosis.\par \par # Rheumatoid arthritis \par - Continue to follow up with rheumatologist for ongoing management.\par - We provided reassurance that given normal liver chemistries even after sulindac was resumed, she can continue taking that medications as needed as prescribed as she does not appear to have idiosyncratic hepatotoxicity when re-challenged. We also discussed that she can take acetaminophen PRN but to limit use to <= 2 grams per day. \par \par # Health Maintenance:\par -Ms. BERKOWITZ was counseled to: abstain from alcohol and all illicit drugs; avoid use of herbal and dietary supplements due to potential hepatotoxicity; limit use of acetaminophen to <2 grams per day\par \par Next follow-up: 1 year with same day FibroScan and h4xtsed labs

## 2023-07-14 NOTE — HISTORY OF PRESENT ILLNESS
[de-identified] : Ms. Golden is a 72 yo F with underweight BMI, chronic GERD, hypertension, prediabetes (with HbA1c 5.8% on 5/14/22), and rheumatoid arthritis (with a remote history of methotrexate use for ~1 year in the 1990s, now on Enbrel since 2007), who is being seen for follow-up of autoimmune hepatitis (AIH). She was first noted to have elevated liver enzymes without jaundice in 9/2021 (previously had been within normal limits as recently as 7/27/21), with laboratory work-up notable for positive SHO with 1:1280 titer and homogeneous pattern and elevated IgG, with other autoimmune serologies all negative (ASMA, SLA, and LKM). She was started on prednisone at 40 mg po daily on 11/12/21 with subsequent taper. She underwent a percutaneous liver biopsy on 12/3/21 with findings suggestive of resolving AIH with focal bridging fibrosis. Azathioprine was added as a steroid-sparing therapy in mid-12/2021 but she was intolerant due to GI side effects and it was discontinued on 1/31/22. Prednisone was tapered off in mid-2/2022 and she has been off immunosuppression since then. Near the time she first developed elevated liver enzymes in 9/2021, her rheumatologist had her stop sulindac and diclofenac.\par \par PCP: Dr. Susannah Kamara\par GI: Dr. Thai Alvarez, Dr. Fahad Galicia\par Rheumatology: Dr. Alicia\par \par Liver, random, ultrasound guided core biopsy (12/3/21):\par - Features of resolving acute or subacute hepatitis, see comment.\par - Portal fibrous expansion with focal bridging fibrosis.\par - Minimal macrovesicular steatosis.\par - Comment: The biopsy consists of a single core of hepatic parenchyma with largely intact architecture as supported by trichrome stain. The portal tracts are expanded by mild chronic infiltrates composed predominantly of mature-appearing lymphocytes, clusters of ceroid laden macrophages, scattered plasma cells, and neutrophils. Mild interface activity is present. The interlobular bile ducts are mostly intact with focal senescence. The lobular parenchyma shows mild lobular inflammation in the form of clusters of ceroid laden macrophages and lymphocytes. Acidophil bodies are present. There is minimal macrovesicular steatosis (less than 5% area). There is no evidence of cholestasis. PAS-D stain highlights the periportal and parenchymal macrophages, which occasionally are in aggregates, indicative of relatively recent contiguous hepatocyte loss. Trichrome stain shows fibrous portal expansion with focal bridging fibrosis. Reticulin demonstrates foci of hepatocellular collapse. Iron stain shows scattered iron storage in Kupffer cells. Overall findings are portal and lobular inflammation with ceroid laden macrophages and mild interface hepatitis. The patient's history of elevated liver function and SHO positivity is noted. These findings most likely represent treated autoimmune hepatitis. Clinical correlation is recommended.\par \par FibroScan (11/30/22): Median liver stiffness 5.3 kPA (normal, consistent with F0-1 fibrosis) and CAP score 214 dB/m (normal, consistent with S0 steatosis).\par \par She was last seen in this office on 11/30/23 and presents today for follow up. Today, she denies any acute complaints. Her rheumatoid arthritis is currently managed on weekly Enbrel injections, increased from 25 mg/mL to 50 mg/mL and was recently placed back on Sulindac 150 mg po but takes it as needed.  Although her rheumatologist recommended she take Sulindac daily to manage her symptoms, she was concerned it may effect her liver so has only taken the medication up to 4 times in the past month, and will take 1 Tylenol 325 mg tablet about 2 times a week.   \par \par She denies use of herbal/dietary supplements.\par \par She has no known family history of liver disease.

## 2023-09-12 ENCOUNTER — APPOINTMENT (OUTPATIENT)
Dept: RADIOLOGY | Facility: IMAGING CENTER | Age: 73
End: 2023-09-12

## 2023-10-23 LAB
ALBUMIN SERPL ELPH-MCNC: 4.5 G/DL
ALP BLD-CCNC: 97 U/L
ALT SERPL-CCNC: 16 U/L
ANION GAP SERPL CALC-SCNC: 12 MMOL/L
AST SERPL-CCNC: 28 U/L
BILIRUB SERPL-MCNC: 0.4 MG/DL
BUN SERPL-MCNC: 11 MG/DL
CALCIUM SERPL-MCNC: 9.5 MG/DL
CHLORIDE SERPL-SCNC: 98 MMOL/L
CO2 SERPL-SCNC: 23 MMOL/L
CREAT SERPL-MCNC: 0.71 MG/DL
EGFR: 90 ML/MIN/1.73M2
GLUCOSE SERPL-MCNC: 100 MG/DL
IGG SER QL IEP: 1771 MG/DL
POTASSIUM SERPL-SCNC: 4.7 MMOL/L
PROT SERPL-MCNC: 8 G/DL
SODIUM SERPL-SCNC: 133 MMOL/L

## 2023-12-27 NOTE — ED ADULT NURSE REASSESSMENT NOTE - GASTROINTESTINAL WDL
Abdomen soft, nontender, nondistended, bowel sounds present in all 4 quadrants.
dental rec appt tm morning.

## 2024-01-16 ENCOUNTER — NON-APPOINTMENT (OUTPATIENT)
Age: 74
End: 2024-01-16

## 2024-01-16 DIAGNOSIS — E87.5 HYPERKALEMIA: ICD-10-CM

## 2024-01-16 LAB
ALBUMIN SERPL ELPH-MCNC: 4.5 G/DL
ALP BLD-CCNC: 104 U/L
ALT SERPL-CCNC: 13 U/L
ANION GAP SERPL CALC-SCNC: 10 MMOL/L
AST SERPL-CCNC: 27 U/L
BILIRUB SERPL-MCNC: 0.3 MG/DL
BUN SERPL-MCNC: 14 MG/DL
CALCIUM SERPL-MCNC: 9.9 MG/DL
CHLORIDE SERPL-SCNC: 98 MMOL/L
CO2 SERPL-SCNC: 25 MMOL/L
CREAT SERPL-MCNC: 0.7 MG/DL
EGFR: 91 ML/MIN/1.73M2
GLUCOSE SERPL-MCNC: 110 MG/DL
IGG SER QL IEP: 1869 MG/DL
POTASSIUM SERPL-SCNC: 5.4 MMOL/L
PROT SERPL-MCNC: 8.3 G/DL
SODIUM SERPL-SCNC: 133 MMOL/L

## 2024-01-18 LAB
ANION GAP SERPL CALC-SCNC: 13 MMOL/L
BUN SERPL-MCNC: 16 MG/DL
CALCIUM SERPL-MCNC: 9.9 MG/DL
CHLORIDE SERPL-SCNC: 98 MMOL/L
CO2 SERPL-SCNC: 22 MMOL/L
CREAT SERPL-MCNC: 0.72 MG/DL
EGFR: 88 ML/MIN/1.73M2
POTASSIUM SERPL-SCNC: 5 MMOL/L
SODIUM SERPL-SCNC: 134 MMOL/L

## 2024-04-03 RX ORDER — UPADACITINIB 45 MG/1
45 TABLET, EXTENDED RELEASE ORAL
Refills: 0 | Status: ACTIVE | COMMUNITY
Start: 2024-04-03

## 2024-07-09 ENCOUNTER — APPOINTMENT (OUTPATIENT)
Dept: HEPATOLOGY | Facility: CLINIC | Age: 74
End: 2024-07-09
Payer: MEDICARE

## 2024-07-09 VITALS
BODY MASS INDEX: 16.79 KG/M2 | HEIGHT: 58 IN | WEIGHT: 80 LBS | SYSTOLIC BLOOD PRESSURE: 153 MMHG | OXYGEN SATURATION: 100 % | TEMPERATURE: 97.1 F | DIASTOLIC BLOOD PRESSURE: 81 MMHG | HEART RATE: 59 BPM

## 2024-07-09 PROCEDURE — 99214 OFFICE O/P EST MOD 30 MIN: CPT

## 2024-07-09 PROCEDURE — 76981 USE PARENCHYMA: CPT

## 2024-07-10 DIAGNOSIS — K75.4 AUTOIMMUNE HEPATITIS: ICD-10-CM

## 2024-07-10 LAB
AFP-TM SERPL-MCNC: 2.2 NG/ML
ALBUMIN SERPL ELPH-MCNC: 4.8 G/DL
ALP BLD-CCNC: 95 U/L
ALT SERPL-CCNC: 21 U/L
ANION GAP SERPL CALC-SCNC: 15 MMOL/L
AST SERPL-CCNC: 37 U/L
BASOPHILS # BLD AUTO: 0.02 K/UL
BASOPHILS NFR BLD AUTO: 0.3 %
BILIRUB SERPL-MCNC: 0.3 MG/DL
BUN SERPL-MCNC: 15 MG/DL
CALCIUM SERPL-MCNC: 10.1 MG/DL
CHLORIDE SERPL-SCNC: 99 MMOL/L
CO2 SERPL-SCNC: 20 MMOL/L
CREAT SERPL-MCNC: 0.71 MG/DL
EGFR: 89 ML/MIN/1.73M2
EOSINOPHIL # BLD AUTO: 0.03 K/UL
EOSINOPHIL NFR BLD AUTO: 0.5 %
FERRITIN SERPL-MCNC: 223 NG/ML
HCT VFR BLD CALC: 37.8 %
HGB BLD-MCNC: 12.6 G/DL
IGG SER QL IEP: 1629 MG/DL
INR PPP: 0.91 RATIO
IRON SERPL-MCNC: 111 UG/DL
LYMPHOCYTES # BLD AUTO: 3.06 K/UL
LYMPHOCYTES NFR BLD AUTO: 50.8 %
MAN DIFF?: NORMAL
MCHC RBC-ENTMCNC: 33.3 GM/DL
MCV RBC AUTO: 90.6 FL
MONOCYTES # BLD AUTO: 0.58 K/UL
MONOCYTES NFR BLD AUTO: 9.6 %
NEUTROPHILS # BLD AUTO: 2.33 K/UL
NEUTROPHILS NFR BLD AUTO: 38.8 %
PLATELET # BLD AUTO: 316 K/UL
POTASSIUM SERPL-SCNC: 5.1 MMOL/L
PROT SERPL-MCNC: 8.7 G/DL
PT BLD: 10.3 SEC
RBC # BLD: 4.17 M/UL
SODIUM SERPL-SCNC: 134 MMOL/L
TIBC SERPL-MCNC: 416 UG/DL
UIBC SERPL-MCNC: 306 UG/DL
WBC # FLD AUTO: 6.02 K/UL

## 2024-10-07 ENCOUNTER — NON-APPOINTMENT (OUTPATIENT)
Age: 74
End: 2024-10-07

## 2024-10-07 DIAGNOSIS — E87.5 HYPERKALEMIA: ICD-10-CM

## 2024-10-18 ENCOUNTER — APPOINTMENT (OUTPATIENT)
Dept: HEPATOLOGY | Facility: CLINIC | Age: 74
End: 2024-10-18
Payer: MEDICARE

## 2024-10-18 VITALS
TEMPERATURE: 96.3 F | HEART RATE: 61 BPM | SYSTOLIC BLOOD PRESSURE: 138 MMHG | DIASTOLIC BLOOD PRESSURE: 81 MMHG | OXYGEN SATURATION: 99 % | WEIGHT: 80 LBS | HEIGHT: 58 IN | RESPIRATION RATE: 16 BRPM | BODY MASS INDEX: 16.79 KG/M2

## 2024-10-18 DIAGNOSIS — K75.4 AUTOIMMUNE HEPATITIS: ICD-10-CM

## 2024-10-18 PROCEDURE — 99214 OFFICE O/P EST MOD 30 MIN: CPT

## 2024-10-18 RX ORDER — TOFACITINIB 5 MG/1
5 TABLET, FILM COATED ORAL
Refills: 0 | Status: ACTIVE | COMMUNITY
Start: 2024-10-18

## 2025-01-21 DIAGNOSIS — K75.4 AUTOIMMUNE HEPATITIS: ICD-10-CM

## 2025-04-15 ENCOUNTER — APPOINTMENT (OUTPATIENT)
Dept: HEPATOLOGY | Facility: CLINIC | Age: 75
End: 2025-04-15

## 2025-04-16 ENCOUNTER — APPOINTMENT (OUTPATIENT)
Dept: HEPATOLOGY | Facility: CLINIC | Age: 75
End: 2025-04-16
Payer: MEDICARE

## 2025-04-16 VITALS
TEMPERATURE: 97.3 F | SYSTOLIC BLOOD PRESSURE: 126 MMHG | RESPIRATION RATE: 16 BRPM | HEIGHT: 58 IN | OXYGEN SATURATION: 99 % | WEIGHT: 80 LBS | HEART RATE: 65 BPM | BODY MASS INDEX: 16.79 KG/M2 | DIASTOLIC BLOOD PRESSURE: 88 MMHG

## 2025-04-16 DIAGNOSIS — K75.4 AUTOIMMUNE HEPATITIS: ICD-10-CM

## 2025-04-16 PROCEDURE — 99214 OFFICE O/P EST MOD 30 MIN: CPT

## 2025-04-16 PROCEDURE — 76981 USE PARENCHYMA: CPT

## 2025-07-30 DIAGNOSIS — K75.4 AUTOIMMUNE HEPATITIS: ICD-10-CM
